# Patient Record
Sex: FEMALE | Race: WHITE
[De-identification: names, ages, dates, MRNs, and addresses within clinical notes are randomized per-mention and may not be internally consistent; named-entity substitution may affect disease eponyms.]

---

## 2022-08-03 ENCOUNTER — HOSPITAL ENCOUNTER (EMERGENCY)
Dept: HOSPITAL 97 - ER | Age: 22
LOS: 1 days | Discharge: HOME | End: 2022-08-04
Payer: COMMERCIAL

## 2022-08-03 DIAGNOSIS — E86.0: Primary | ICD-10-CM

## 2022-08-03 DIAGNOSIS — Z20.822: ICD-10-CM

## 2022-08-03 DIAGNOSIS — G40.89: ICD-10-CM

## 2022-08-03 LAB
ALBUMIN SERPL BCP-MCNC: 4.6 G/DL (ref 3.4–5)
ALP SERPL-CCNC: 79 U/L (ref 45–117)
ALT SERPL W P-5'-P-CCNC: 27 U/L (ref 12–78)
AST SERPL W P-5'-P-CCNC: 19 U/L (ref 15–37)
BUN BLD-MCNC: 11 MG/DL (ref 7–18)
BUN BLD-MCNC: 8 MG/DL (ref 7–18)
GLUCOSE SERPLBLD-MCNC: 303 MG/DL (ref 74–106)
GLUCOSE SERPLBLD-MCNC: 95 MG/DL (ref 74–106)
HCT VFR BLD CALC: 38.9 % (ref 36–45)
INR BLD: 1.12
LYMPHOCYTES # SPEC AUTO: 1.9 K/UL (ref 0.7–4.9)
MCV RBC: 90.5 FL (ref 80–100)
METHAMPHET UR QL SCN: NEGATIVE
PMV BLD: 7.4 FL (ref 7.6–11.3)
POTASSIUM SERPL-SCNC: 3.5 MMOL/L (ref 3.5–5.1)
POTASSIUM SERPL-SCNC: 3.8 MMOL/L (ref 3.5–5.1)
RBC # BLD: 4.3 M/UL (ref 3.86–4.86)
SPECIMEN VOL FLD: 5.5 ML
THC SERPL-MCNC: POSITIVE NG/ML

## 2022-08-03 PROCEDURE — 74177 CT ABD & PELVIS W/CONTRAST: CPT

## 2022-08-03 PROCEDURE — 93005 ELECTROCARDIOGRAM TRACING: CPT

## 2022-08-03 PROCEDURE — 99284 EMERGENCY DEPT VISIT MOD MDM: CPT

## 2022-08-03 PROCEDURE — 80320 DRUG SCREEN QUANTALCOHOLS: CPT

## 2022-08-03 PROCEDURE — 85610 PROTHROMBIN TIME: CPT

## 2022-08-03 PROCEDURE — 36415 COLL VENOUS BLD VENIPUNCTURE: CPT

## 2022-08-03 PROCEDURE — 85730 THROMBOPLASTIN TIME PARTIAL: CPT

## 2022-08-03 PROCEDURE — 96361 HYDRATE IV INFUSION ADD-ON: CPT

## 2022-08-03 PROCEDURE — 84157 ASSAY OF PROTEIN OTHER: CPT

## 2022-08-03 PROCEDURE — 96360 HYDRATION IV INFUSION INIT: CPT

## 2022-08-03 PROCEDURE — 62270 DX LMBR SPI PNXR: CPT

## 2022-08-03 PROCEDURE — 80048 BASIC METABOLIC PNL TOTAL CA: CPT

## 2022-08-03 PROCEDURE — 80307 DRUG TEST PRSMV CHEM ANLYZR: CPT

## 2022-08-03 PROCEDURE — 80076 HEPATIC FUNCTION PANEL: CPT

## 2022-08-03 PROCEDURE — 89050 BODY FLUID CELL COUNT: CPT

## 2022-08-03 PROCEDURE — 81003 URINALYSIS AUTO W/O SCOPE: CPT

## 2022-08-03 PROCEDURE — 82945 GLUCOSE OTHER FLUID: CPT

## 2022-08-03 PROCEDURE — 70450 CT HEAD/BRAIN W/O DYE: CPT

## 2022-08-03 PROCEDURE — 85025 COMPLETE CBC W/AUTO DIFF WBC: CPT

## 2022-08-03 PROCEDURE — 87070 CULTURE OTHR SPECIMN AEROBIC: CPT

## 2022-08-03 PROCEDURE — 80329 ANALGESICS NON-OPIOID 1 OR 2: CPT

## 2022-08-03 NOTE — EDPHYS
Physician Documentation                                                                           

 [facility]                                                                 

Name: Clarissa Anand                                                                            

Age: 21 yrs                                                                                       

Sex: Female                                                                                       

: 2000                                                                                   

MRN: [id number]                                                                                   

Arrival Date: 2022                                                                          

Time: 19:10                                                                                       

Account#: [id number]                                                                            

Bed 5                                                                                             

Private MD:                                                                                       

ED Physician Krzysztof Moore                                                                         

HPI:                                                                                              

                                                                                             

20:01 This 21 yrs old Female presents to ER via EMS with complaints of possible seizure.      rn  

20:01 The patient presents after having a possible seizure episode. Character of seizure(s):  rn  

      Motor activity: generalized, Circulation: the patient did not experience evidence of        

      pulse disturbance. Seizure onset: just prior to arrival. Associated injury: The patient     

      did not suffer any apparent associated injury. Current symptoms: confusion. It is           

      unknown whether or not the patient has had similar symptoms in the past. The patient        

      has not recently seen a physician. EMS reports patient with possible seizure today, had     

      been throwing up and boyfriend gave her either a phenergan or zofran, family reported       

      seizure like activity, no hx of seizures. No trauma. NO fever. EMS did not witness          

      seizure. Pt with decreased responsiveness upon EMS arrival but becoming more alert with     

      time. .                                                                                     

                                                                                                  

Historical:                                                                                       

- Allergies:                                                                                      

19:30 No Known Allergies;                                                                     vc1 

- Home Meds:                                                                                      

19:30 None [Active];                                                                          vc1 

- PSHx:                                                                                           

19:30 None;                                                                                   vc1 

                                                                                                  

- Immunization history:: Adult Immunizations up to date, Unknown.                                 

- Social history:: Smoking status: unknown.                                                       

- Family history:: not pertinent.                                                                 

- Hospitalizations: : No recent hospitalization is reported.                                      

                                                                                                  

                                                                                                  

ROS:                                                                                              

20:01 Unable to obtain ROS due to patient being uncooperative.                                rn  

                                                                                                  

Exam:                                                                                             

20:01 Constitutional:  This is a well developed, well nourished patient who is awake, alert,  rn  

      and in no acute distress.  Not cooperating, not answering questions.  Covers head with      

      blanket. Head/Face:  Normocephalic, atraumatic. Eyes:  Pupils equal round and reactive      

      to light, extra-ocular motions intact.  Lids and lashes normal.  Conjunctiva and sclera     

      are non-icteric and not injected.  Cornea within normal limits.  Periorbital areas with     

      no swelling, redness, or edema. Cardiovascular:  Regular rate and rhythm.  No pulse         

      deficits. Respiratory:  No increased work of breathing, no retractions or nasal             

      flaring. Abdomen/GI:  Soft, non-tender Skin:  Warm, dry MS/ Extremity:  Pulses equal,       

      no cyanosis.   Neuro:  Awake and alert, GCS 15, moves all 4 extremities, withdraws and      

      localizes pain, does not cooperate with most of exam.                                       

21:10 ECG was reviewed by the Attending Physician.                                            rn  

                                                                                                  

Vital Signs:                                                                                      

19:19  / 57; Pulse 60; Resp 18; Temp 98.1(A); Pulse Ox 99% on R/A;                      oe  

22:22  / 68; Pulse 63; Resp 18; Pulse Ox 100% ;                                         vc1 

23:30  / 65; Pulse 64; Resp 17; Pulse Ox 100% ;                                         vc1 

                                                                                                  

Procedures:                                                                                       

21:28 Lumbar Puncture: Patient placed in left lateral decubitus position. Prepped with        rn  

      Betadine. Draped using sterile technique. used lidocaine, 2 cc. Collected 5 ml's of         

      clear fluid. Sample sent to lab. Puncture site dressed with band aid, Patient tolerated     

      well. Single stick, opening pressure only 11.                                               

                                                                                                  

MDM:                                                                                              

19:11 Patient medically screened.                                                             rn  

23:04 ED course: Pt feeling much better, no acute findings to explain seizure, CSF studies    rn  

      and imaging of brain and abdomen negative. .                                                

23:49 Differential diagnosis: seizure, adverse drug reaction, meningitis, new onset seizure,  rn  

      electrolyte disorder. Data reviewed: vital signs, nurses notes, lab test result(s),         

      EKG, radiologic studies, CT scan, and as a result, I will discharge patient.                

      Counseling: I had a detailed discussion with the patient and/or guardian regarding: the     

      historical points, exam findings, and any diagnostic results supporting the                 

      discharge/admit diagnosis, lab results, radiology results, the need for outpatient          

      follow up, to return to the emergency department if symptoms worsen or persist or if        

      there are any questions or concerns that arise at home. Response to treatment: the          

      patient's symptoms have resolved after treatment, the patient's condition has returned      

      to base line, the patient is now symptom free, and as a result, I will discharge            

      patient. Special discussion: I discussed with the patient/guardian in detail that at        

      this point there is no indication for admission to the hospital. It is understood,          

      however, that if the symptoms persist or worsen the patient needs to return immediately     

      for re-evaluation. Based on the history and exam findings, there is no indication for       

      further emergent testing or inpatient evaluation. I discussed with the patient/guardian     

      the need to see the primary care provider for further evaluation of the symptoms.           

                                                                                                  

                                                                                             

19:11 Order name: Acetaminophen; Complete Time: 21:07                                         rn  

                                                                                             

19:11 Order name: Basic Metabolic Panel; Complete Time: 21:07                                 rn  

                                                                                             

19:11 Order name: CBC with Diff; Complete Time: 20:06                                         rn  

                                                                                             

19:11 Order name: ETOH Level; Complete Time: 21:                                            rn  

                                                                                             

19:11 Order name: Hepatic Function; Complete Time: 21:                                      rn  

                                                                                             

19:11 Order name: PT-INR; Complete Time: 20:                                                rn  

                                                                                             

19:11 Order name: Ptt, Activated; Complete Time: 20:                                        rn  

                                                                                             

19:11 Order name: Salicylate; Complete Time: 21:                                            rn  

                                                                                             

19:11 Order name: Urine Drug Screen; Complete Time: 21:                                     rn  

                                                                                             

20:01 Order name: SARS-COV-2 RT PCR (Document "Date of Onset" if Symptomatic); Complete Time: rn  

      23:48                                                                                       

                                                                                             

21:28 Order name: Csf Culture                                                                 rn  

                                                                                             

21:28 Order name: Fluid Cell Count,Body; Complete Time: 22:17                                 rn  

                                                                                             

19:11 Order name: EKG; Complete Time: 19:12                                                   rn  

                                                                                             

19:11 Order name: EKG - Nurse/Tech; Complete Time: 20:53                                      rn  

                                                                                             

19:11 Order name: IV Saline Lock; Complete Time: 20:53                                        rn  

                                                                                             

19:11 Order name: Labs collected and sent; Complete Time: 20:53                               rn  

                                                                                             

19:11 Order name: Urine Dipstick-Ancillary (obtain specimen); Complete Time: 20:53            rn  

                                                                                             

19:11 Order name: Urine Pregnancy Test (obtain specimen); Complete Time: 20:53                rn  

                                                                                             

19:11 Order name: CT Head Brain wo Cont; Complete Time: 21:07                                 rn  

                                                                                             

19:11 Order name: Glucose Level; Complete Time: 22:39                                         rn  

                                                                                             

20:06 Order name: CT Abd/Pelvis - IV Contrast Only; Complete Time: 21:36                      rn  

                                                                                             

20:17 Order name: Lumbar Puncture Consent; Complete Time: 22:08                               rn  

                                                                                             

20:17 Order name: Lumbar Puncture Setup; Complete Time: 22:08                                 rn  

                                                                                             

21:28 Order name: Spinal Fluid Profile; Complete Time: 22:17                                  rn  

                                                                                             

22:00 Order name: Urine Dipstick-Ancillary; Complete Time: 22:17                              [facility]

                                                                                             

22:18 Order name: BMP: once bolus complete; Complete Time: 23:13                              rn  

                                                                                                  

EC:10 Rate is 62 beats/min. Rhythm is regular. QRS Axis is Normal. NH interval is normal. QRS rn  

      interval is normal. QT interval is normal. No Q waves. T waves are Normal. No ST            

      changes noted. Clinical impression: Normal ECG. Interpreted by me. Reviewed by me.          

                                                                                                  

Administered Medications:                                                                         

19:52 Drug: NS 0.9% 1000 ml Route: IV; Rate: 1000 ml; Site: left antecubital;                   

22:07 Follow up: Response: No adverse reaction; IV Status: Completed infusion; IV Intake:     aa9 

      1000ml                                                                                      

                                                                                                  

                                                                                                  

Disposition Summary:                                                                              

22 23:50                                                                                    

Discharge Ordered                                                                                 

      Location: Home                                                                          rn  

      Problem: new                                                                            rn  

      Symptoms: are resolved                                                                  rn  

      Condition: Stable                                                                       rn  

      Diagnosis                                                                                   

        - Nausea with vomiting, unspecified                                                   rn  

        - Dehydration                                                                         rn  

        - Other seizures                                                                      rn  

      Followup:                                                                               rn  

        - With: Private Physician                                                                  

        - When: As needed                                                                          

        - Reason: Recheck today's complaints, Re-evaluation by your physician                      

      Discharge Instructions:                                                                     

        - Discharge Summary Sheet                                                             rn  

        - Dehydration, Adult                                                                  rn  

        - Nausea and Vomiting, Adult                                                          rn  

        - Seizure, Adult                                                                      rn  

      Forms:                                                                                      

        - Medication Reconciliation Form                                                      rn  

        - Thank You Letter                                                                    rn  

        - Antibiotic Education                                                                rn  

        - Prescription Opioid Use                                                             rn  

Signatures:                                                                                       

Dispatcher [employer]                           EDRoma Colby RN                     RN   Krzysztof Ramos MD MD rn Calcote, Vanessa, RN                    RN   1                                                  

Luz Maria Kruger                           RN   aa9                                                  

                                                                                                  

Corrections: (The following items were deleted from the chart)                                    

21:29 21:28 Lumbar Puncture: Patient placed in left lateral decubitus position. Prepped with  rn  

      Betadine. Draped using sterile technique. Collected 5 ml's of clear fluid. Sample sent      

      to lab. Puncture site dressed with band aid, Patient tolerated well. Single stick,          

      opening pressure only 11. rn                                                                

                                                                                                  

**************************************************************************************************

## 2022-08-03 NOTE — RAD REPORT
EXAM DESCRIPTION:  CT - Head Brain Wo Cont - 8/3/2022 7:58 pm

 

CLINICAL HISTORY:  possible seizure, first one

 

COMPARISON:  No comparisons

 

TECHNIQUE:  Axial 5 mm thick images of the head were obtained without IV contrast.

 

All CT scans are performed using dose optimization technique as appropriate and may include automated
 exposure control or mA/KV adjustment according to patient size.

 

FINDINGS:  No intracranial hemorrhage, mass, edema or shift of mid-line structures. No acute infarcti
on changes seen. No abnormal extra-axial fluid collections. Ventricles are normal.

 

Mastoid air cells and visualized portions of the paranasal sinuses are clear.

 

No acute bony findings.

 

 

IMPRESSION:  Negative non-contrast CT head examination.

## 2022-08-03 NOTE — RAD REPORT
EXAM DESCRIPTION:  CT - Abdomen   Pelvis W Contrast - 8/3/2022 9:07 pm

 

CLINICAL HISTORY:  vomiting, elevated WBC

 

COMPARISON:  No comparisons

 

TECHNIQUE:  Biphasic, helical CT imaging of the abdomen and pelvis was performed following 100 ml non
-ionic IV contrast.

 

No oral contrast was given.

 

All CT scans are performed using dose optimization technique as appropriate and may include automated
 exposure control or mA/KV adjustment according to patient size.

 

FINDINGS:  No suspicious findings in the lung bases.

 

The liver, spleen, and pancreas show no suspicious findings. Gallbladder and biliary tree are also wi
thout suspicious finding.

 

Symmetric renal function is seen with no hydronephrosis or suspicious renal mass. No pyelonephritis o
r acute parenchymal process. No bladder abnormalities. No adrenal abnormalities. Uterus and ovaries s
how no suspicious findings.

 

No dilated bowel loops or bowel wall thickening. No appendicitis findings. No free air, free fluid or
 inflammatory stranding.  No hernia, mass or bulky lymphadenopathy.

 

No suspicious bony findings.

 

 

IMPRESSION:  Contrast enhanced CT abdomen and pelvis showing no significant or suspicious finding.

## 2022-08-03 NOTE — ER
Nurse's Notes                                                                                     

 [facility]                                                                 

Name: Clarissa Anand                                                                            

Age: 21 yrs                                                                                       

Sex: Female                                                                                       

: 2000                                                                                   

MRN: [id number]                                                                                   

Arrival Date: 2022                                                                          

Time: 19:10                                                                                       

Account#: [id number]                                                                            

Bed 5                                                                                             

Private MD:                                                                                       

Diagnosis: Nausea with vomiting, unspecified;Dehydration;Other seizures                           

                                                                                                  

Presentation:                                                                                     

                                                                                             

19:22 Chief complaint: EMS states: "She was feeling nauseous so her boyfriend gave her a few  vc1 

      of his phenergan. He wasn't able to say how many or the dose. A little while later she      

      had a seizure and it looks like she bit her lip. No seizure observed in route and she       

      hasn't said anything. She has started becoming very restless.". Coronavirus screen: At      

      this time, the client does not indicate any symptoms associated with coronavirus-19.        

      Ebola Screen: No symptoms or risks identified at this time. Initial Sepsis Screen: Does     

      the patient meet any 2 criteria? No. Patient's initial sepsis screen is negative. Does      

      the patient have a suspected source of infection? No. Patient's initial sepsis screen       

      is negative. Risk Assessment: Do you want to hurt yourself or someone else? Patient         

      reports no desire to harm self or others. Onset of symptoms was 2022.            

19:22 Method Of Arrival: EMS: [facility]                                                       1 

19:22 Acuity: HANSA 2                                                                           vc1 

                                                                                                  

Triage Assessment:                                                                                

19:47 General: Appears distressed, uncomfortable, Behavior is agitated, anxious, listless,    aa9 

      uncooperative.                                                                              

                                                                                                  

Historical:                                                                                       

- Allergies:                                                                                      

19:30 No Known Allergies;                                                                     vc1 

- Home Meds:                                                                                      

19:30 None [Active];                                                                          vc1 

- PSHx:                                                                                           

19:30 None;                                                                                   vc1 

                                                                                                  

- Immunization history:: Adult Immunizations up to date, Unknown.                                 

- Social history:: Smoking status: unknown.                                                       

- Family history:: not pertinent.                                                                 

- Hospitalizations: : No recent hospitalization is reported.                                      

                                                                                                  

                                                                                                  

Screenin:56 Abuse screen: Denies threats or abuse. Nutritional screening: No deficits noted.        kl  

      Tuberculosis screening: No symptoms or risk factors identified. Fall Risk IV access (20     

      points). Mental Status- Overestimates/Forgets Limitations (15 pts.).                        

                                                                                                  

Assessment:                                                                                       

19:53 General: Appears distressed, slender, Behavior is drowsy, restless. Pain: Unable to use kl  

      pain scale. pt reports pain but unable to clarify where. Neuro: Level of Consciousness      

      is confused, Oriented to person, pt unable to participate at this time. Cardiovascular:     

      Heart tones S1 S2 Rhythm is sinus rhythm. Respiratory: No deficits noted. Airway is         

      patent Trachea midline Respiratory effort is even, unlabored, Respiratory pattern is        

      regular, Breath sounds are clear bilaterally. GI: Parent/caregiver reports the patient      

      having intolerance of food, intolerance of fluids, nausea. : No deficits noted. No        

      signs and/or symptoms were reported regarding the genitourinary system. EENT: No            

      deficits noted. No signs and/or symptoms were reported regarding the EENT system. Derm:     

      No deficits noted. No signs and/or symptoms reported regarding the dermatologic system.     

      Parent/caregiver reports the patient having pt has been vomiting all day and was given      

      Zofran 8mg then became unresponsive.                                                        

20:14 Reassessment: Patient appears in no apparent distress at this time. Patient states      kl  

      feeling better. Patient states symptoms have improved. pt awake alert cooperative           

      provider at bedside speaking with family and patient.                                       

22:21 Reassessment: Patient and/or family updated on plan of care and expected duration. Pain vc1 

      level reassessed. Patient is alert, oriented x 3, equal unlabored respirations, skin        

      warm/dry/pink. Patient states symptoms have improved.                                       

                                                                                             

00:19 Reassessment: Patient and/or family updated on plan of care and expected duration. Pain vc1 

      level reassessed. Patient is alert, oriented x 3, equal unlabored respirations, skin        

      warm/dry/pink. Patient states feeling better. Patient states symptoms have improved.        

                                                                                                  

Vital Signs:                                                                                      

                                                                                             

19:19  / 57; Pulse 60; Resp 18; Temp 98.1(A); Pulse Ox 99% on R/A;                      oe  

22:22  / 68; Pulse 63; Resp 18; Pulse Ox 100% ;                                         vc1 

23:30  / 65; Pulse 64; Resp 17; Pulse Ox 100% ;                                         vc1 

                                                                                                  

ED Course:                                                                                        

19:10 Patient arrived in ED.                                                                  rn  

19:10 Krzysztof Moore MD is Attending Physician.                                                rn  

19:25 Triage completed.                                                                       vc1 

19:38 Acetaminophen Sent.                                                                     kl  

19:38 Basic Metabolic Panel Sent.                                                             kl  

19:38 CBC with Diff Sent.                                                                     kl  

19:38 ETOH Level Sent.                                                                        kl  

19:38 Hepatic Function Sent.                                                                  kl  

19:38 PT-INR Sent.                                                                            kl  

19:38 Ptt, Activated Sent.                                                                    kl  

19:47 Arm band placed on.                                                                     aa9 

19:47 Patient has correct armband on for positive identification. Bed in low position. Side   aa9 

      rails up X2.                                                                                

20:00 CT Head Brain wo Cont In Process Unspecified.                                           EDMS

21:08 CT Abd/Pelvis - IV Contrast Only In Process Unspecified.                                EDMS

21:49 No apparent distress. Resting quietly.                                                  kl  

22:09 SARS-COV-2 RT PCR (Document "Date of Onset" if Symptomatic) Sent.                       aa9 

22:24 BMP: once bolus complete Sent.                                                          la1 

23:06 BMP: once bolus complete Sent.                                                          tw5 

08                                                                                             

00:17 No provider procedures requiring assistance completed. IV discontinued, intact,         vc1 

      bleeding controlled, No redness/swelling at site. Pressure dressing applied.                

                                                                                                  

Administered Medications:                                                                         

                                                                                             

19:52 Drug: NS 0.9% 1000 ml Route: IV; Rate: 1000 ml; Site: left antecubital;                 kl  

22:07 Follow up: Response: No adverse reaction; IV Status: Completed infusion; IV Intake:     aa9 

      1000ml                                                                                      

                                                                                                  

                                                                                                  

Medication:                                                                                       

                                                                                             

00:19 VIS not applicable for this client.                                                     vc1 

                                                                                                  

Intake:                                                                                           

                                                                                             

22:07 IV: 1000ml; Total: 1000ml.                                                              aa9 

                                                                                                  

Outcome:                                                                                          

23:50 Discharge ordered by MD.                                                                rn  

                                                                                             

00:17 Discharged to home ambulatory, with significant other.                                  vc1 

      Condition: good                                                                             

      Discharge instructions given to patient, Instructed on discharge instructions, follow       

      up and referral plans. Demonstrated understanding of instructions, follow-up care.          

00:20 Patient left the ED.                                                                    vc1 

                                                                                                  

Signatures:                                                                                       

Dispatcher [employer]                           EDRoma Colby RN RN kl Nieto, Roman, MD MD rn Attema, Lee, FNP-C                      FNP-Cla1                                                  

Yousuf Schmitz Tiffany                                tw5                                                  

Radha Bennett RN RN   vc1                                                  

Luz Maria Kruger RN RN   aa9                                                  

                                                                                                  

**************************************************************************************************

## 2022-08-03 NOTE — XMS REPORT
Continuity of Care Document

                            Created on:August 3, 2022



Patient:CLARISSA ANAND

Sex:Female

:2000

External Reference #:[id number]





Demographics







                          Address                   [address] [place] [address]

 

                          Home Phone                [phone]

 

                          Work Phone                [phone]

 

                          Mobile Phone              [phone]

 

                          Email Address             [email or web address] CityIgnite Game [email or web address] Health [facility]

 

                          Preferred Language        English

 

                          Marital Status            Unknown

 

                          Pentecostal Affiliation     Unknown

 

                          Race                      Unknown

 

                          Additional Race(s)        Unavailable



                                                    Unavailable



                                                    White

 

                          Ethnic Group              Unknown









Author







                          Organization              [facility]

t

 

                          Address                   [address]



                                                    [place] [address]

 

                          Phone                     [phone]









Support







                Name            Relationship    Address         Phone

 

                Clarissa Anand Mother          [address] [phone]



                                                [place] [address] 

 

                Kwabena Anand Unavailable     [address] [phone]



                                                [place] [id number] 

 

                KWABENA ANAND Mother          [address] [place] [phone]



                                                [place] [id number] 









Care Team Providers







                    Name                Role                Phone

 

                    Pcp, Patient Does Not Have A Primary Care Physician [phone]



 

                    Rosina Carranza      Attending Clinician Unavailable

 

                    Deb Molina    Attending Clinician Unavailable

 

                    PARVEZ STARR         Attending Clinician Unavailable

 

                    Nurse, Nel Haro     Attending Clinician Unavailable

 

                    Izabel HAYES, Sheyla HART Attending Clinician [phone]

 

                    Claudia Brody RN     Attending Clinician Unavailable

 

                    Only, Ang Db Test   Attending Clinician Unavailable

 

                    Jonna Davalos MD     Attending Clinician [phone]

 

                    JONNA DAVALOS        Attending Clinician Unavailable

 

                    Lab, Adc Fam Pob I  Attending Clinician Unavailable

 

                    Aziza Bettencourt  Attending Clinician [phone]

 

                    2, Adc Lab          Attending Clinician Unavailable

 

                    Parvez Starr MD      Attending Clinician [phone]

 

                    Doctor Unassigned, No Name Attending Clinician Unavailable

 

                    Physician, No Primary Care Admitting Clinician Unavailable









Payers







           Payer Name Policy Type Policy Number Effective Date Expiration Date S

[facility]            [id number]  2020            



           PPO                              00:00:00              







Problems

This patient has no known problems.



Allergies, Adverse Reactions, Alerts







       Allergy Allergy Status Severity Reaction(s) Onset  Inactive Treating Comm

ents 

Source



       Name   Type                        Date   Date   Clinician        

 

       PENICILL DRUG   Active        Hives                        Univers



       IN     INGREDI                      1-25                        ity of



                                          00:00:                      [place]



                                          00                          Medical



                                                                      Branch

 

       [place] Propensi Active        Hives                        Univer

s



       in     ty to                       -25                        ity of



              adverse                      00:00:                      [place]



              reaction                      00                          Medical



              s                                                       Branch

 

       NO KNOWN Drug   Active                                           Univers



       ALLERGIE Class                                                   ity of



       S                                                              [facility]







Social History







           Social Habit Start Date Stop Date  Quantity   Comments   Source

 

           Exposure to                       Yes                   [facility]



           SARS-CoV-2                                             [facility]



           (event)                                                Branch

 

           Alcohol intake 2021-07-15 2021-07-15 Ex-drinker            University

 of



                      00:00:00   00:00:00   (finding)             [facility]

 

           Tobacco use and 2021 Never used            Universit

y of



           exposure   00:00:00   00:00:00                         [facility]

 

           Sex Assigned At 2000                       Universit

y of



           Birth      00:00:00   00:00:00                         [facility]









                Smoking Status  Start Date      Stop Date       Source

 

                Unknown if ever smoked                                 Universit

y of [place] [facility]



                                                                [place]

 

                Smoker, current status 2021 00:00:00                 Unive

rsity of [facility]



                unknown                                         Branch







Medications







       Ordered Filled Start  Stop   Current Ordering Indication Dosage Frequency

 Signature

                    Comments            Components          Source



     Medication Medication Date Date Medication? Clinician                (SIG) 

          



     Name Name                                                   

 

     medroxyPROG            Yes       [id number] 150mg                     

Univers



     ESTERone      6-07                                              ity of



     (DEPO-PROVE      15:15:                                              [place]



     RA)       00                                                Medical



     injection                                                        Branch



     150 mg                                                        

 

     medroxyPROG      -0      Yes       [id number] 150mg                     

Univers



     ESTERone      6-07                                              ity of



     (DEPO-PROVE      15:15:                                              [place]



     RA)       00                                                Medical



     injection                                                        Branch



     150 mg                                                        

 

     medroxyPROG      -0      Yes       [id number] 150mg                     

Univers



     ESTERone      6-07                                              ity of



     (DEPO-PROVE      15:15:                                              [place]



     RA)       00                                                Medical



     injection                                                        Branch



     150 mg                                                        

 

     medroxyPROG      -0      Yes       [id number] 150mg                     

Univers



     ESTERone      6-07                                              ity of



     (DEPO-PROVE      15:15:                                              [place]



     RA)       00                                                Medical



     injection                                                        Branch



     150 mg                                                        

 

     medroxyPROG      -0      Yes       [id number] 150mg                     

Univers



     ESTERone      6-07                                              ity of



     (DEPO-PROVE      15:15:                                              [place]



     RA)       00                                                Medical



     injection                                                        Branch



     150 mg                                                        

 

     medroxyPROG      0 - No             150mg                     Univ

ers



     ESTERone      1-29 12-31                                         ity of



     (DEPO-PROVE      19:45: 19:44                                         [place]



     RA)       00   :00                                          Medical



     injection                                                        Branch



     150 mg                                                        

 

     medroxyPROG      2021- No             150mg                     Univ

ers



     ESTERone                                               ity of



     (DEPO-PROVE      19:45: 19:44                                         [place]



     RA)       00   :00                                          Medical



     injection                                                        Branch



     150 mg                                                        

 

     medroxyPROG      2021- No             150mg                     Univ

ers



     ESTERone                                               ity of



     (DEPO-PROVE      19:45: 19:44                                         [place]



     RA)       00   :00                                          Medical



     injection                                                        Branch



     150 mg                                                        

 

     medroxyPROG      2021- No             150mg                     Univ

ers



     ESTERone                                               ity of



     (DEPO-PROVE      19:45: 19:44                                         [place]



     RA)       00   :00                                          Medical



     injection                                                        Branch



     150 mg                                                        

 

     medroxyPROG      2021- No             150mg                     Univ

ers



     ESTERone                                               ity of



     (DEPO-PROVE      19:45: 19:44                                         [place]



     RA)       00   :00                                          Medical



     injection                                                        Branch



     150 mg                                                        

 

     medroxyPROG      2021- No             150mg      150 mg,           U

nivers



     ESTERone                                Intramuscu           ity 

of



     (DEPO-PROVE      19:45: 19:44                          lar,           [place]



     RA)       00   :00                           [id number],           Medical



     injection                                         4 doses,           Branch



     150 mg                                         First dose           



                                                  on 21 at           



                                                  1345, Last           



                                                  dose on           



                                                  Fri            



                                                  10/8/21 at           



                                                  1345,           



                                                  Routine           

 

     mv,Ca,min/i      0      Yes                      Take by           Uni

vers



     juan/FA/guar      1-25                               mouth.           ity of



     shea/caff      21:18:                                              [place]



     (ONE-A-DAY      11                                                Medical



     WOMEN'S                                                        Branch



     ACTIVE                                                        



     ORAL)                                                        

 

     mv,Ca,min/i      -0      Yes                      Take by           Uni

vers



     juan/FA/guar      1-25                               mouth.           ity of



     shea/caff      21:18:                                              [place]



     (ONE-A-DAY      11                                                Medical



     WOMEN'S                                                        Branch



     ACTIVE                                                        



     ORAL)                                                        

 

     mv,Ca,min/i      -0      Yes                      Take by           Uni

vers



     juan/FA/guar      1-25                               mouth.           ity of



     shea/caff      15:18:                                              [place]



     (ONE-A-DAY      11                                                Medical



     WOMEN'S                                                        Branch



     ACTIVE                                                        



     ORAL)                                                        

 

     mv,Ca,min/i      2021-0      Yes                      Take by           Uni

vers



     juan/FA/guar      1-25                               mouth.           ity of



     shea/caff      15:18:                                              [place]



     (ONE-A-DAY      11                                                Medical



     WOMEN'S                                                        Branch



     ACTIVE                                                        



     ORAL)                                                        

 

     mv,Ca,min/i            Yes                      Take by           Uni

vers



     juan/FA/guar      1-25                               mouth.           ity of



     shea/caff      15:18:                                              [place]



     (ONE-A-DAY      11                                                Medical



     WOMEN'S                                                        Branch



     ACTIVE                                                        



     ORAL)                                                        

 

     mv,Ca,min/i            Yes                      Take by           Uni

vers



     juan/FA/guar      1-25                               mouth.           ity of



     shea/caff      15:18:                                              [place]



     (ONE-A-DAY      11                                                Medical



     WOMEN'S                                                        Branch



     ACTIVE                                                        



     ORAL)                                                        







Procedures







                Procedure       Date / Time     Performing Clinician Source



                                Performed                       

 

                PROLACTIN       2021 19:25:00 [facility], [facility]

 

                THYROID STIMULATING 2021 19:25:00 Fish Parvez     [facility]

[facility] [place]



                HORMONE                                         [facility]

 

                BASIC METABOLIC PANEL 2021 19:25:00 Fish Parvez     Univer

[facility] [place]



                (NA, K, CL, CO2,                                 Medical Branch



                GLUCOSE, BUN,                                   



                CREATININE, CA)                                 

 

                LIPID PANEL     2021 19:25:00 [facility], [facility]



                ([id number])(TOTAL                                   Medical Branch



                CHOLESTEROL,                                    



                TRIGLYCERIDES, HDL)                                 

 

                ADC OR FELICITY ONLY - 2021 19:25:00 Fish, Parvez     Univer

[facility] [place]



                RPR                                             Medical Branch

 

                HIV 1/2 AG-AB WITH 2021 19:25:00 Fish Parvez     [facility]

[facility] [place]



                REFLEX                                          [facility]

 

                ASSIGNMENT OF BENEFITS 2021 20:55:16 Doctor Unassigned, No

 [facility]



                                                Name            Medical Branch







Encounters







        Start   End     Encounter Admission Attending Care    Care    Encounter 

Source



        Date/Time Date/Time Type    Type    Clinicians Facility Department ID   

   

 

        [facility]  [id number]

 Common



        08:07:02                         Rosina                     [facility]

 

        2022         Outpatient         Tere [facility]  [id number]

 Common



        09:09:04                         Rosina                     [facility]

 

        [id number] 

[facility]



        00:02:00                         Deb                    [facility]



                                                                        [facility]

 

        2020         [facility]    [id number] 

[facility]



        00:06:00                         Deb                    [facility]



                                                                        [facility]



                                                                        l of



                                                                        [place]

 

        2022 ambulatory                 [facility]  [facility]  [id number]

 Common



        00:00:00 00:00:00                                                 [facility]

 

        2022 ambulatory                 [facility]  [facility]  [id number]

 Common



        00:00:00 00:00:00                                                 [facility]

 

        2022 ambulatory                 [facility]  [facility]  [id number]

 Common



        00:00:00 00:00:00                                                 [facility]

 

        2022 ambulatory                 [facility]  [facility]  [id number]

 Common



        00:00:00 00:00:00                                                 [facility]

 

        2022 Outpatient DARNELL DE LA PAZN [facility]    [id number]

[id number] Univers



        14:00:00 14:00:00                                         [id number]  [facility]

 

        2022 Outpatient DARNELL DE LA PAZN [facility]    [id number]

[id number] Univers



        14:00:00 14:00:00                                                 [facility]

 

        2022 Outpatient                 [facility]    [id number]

-20 Univers



        13:00:00 13:00:00                                         [id number]  [facility]

 

        2021-10-13 2021-10-13 Nurse           Nurse, Julioj Tahir [facility] 1.2.840.

[id number] 

Univers



        10:00:39 10:24:33 Visit           Sheyla Paiz [id number]

         ity of



                                                Pediatric [id number]         [facility]  [phone]         [address]

 

        2021-10-13 2021-10-13 Outpatient R               [facility]    [id number]

[id number] Univers



        10:20:00 10:20:00                                                 [facility]

 

        2021-10-13 2021-10-13 Outpatient R               [facility]    [id number]

-20 Univers



        10:00:00 10:00:00                                         [id number]  ity [facility]

 

        2021-10-13 2021-10-13 Outpatient R               [facility]    [id number]

[id number] Univers



        10:00:00 10:00:00                                                 ity [facility]

 

        2021 Telephone         OH Brody    [phone] [id number]

[id number] Univers



        00:00:00 00:00:00                 Claudia NAVA   [id number]         it

y of



                                                [facility] [id number]         Marco A

as



                                                        [phone]         [address]

 

        2021 Laboratory         Only, Ang Db Test [facility]    1.2.8

[id number] 

Univers



        09:03:10 09:18:10 Only            Anoop [facility]  [id number]      

   ity of



                                                [place] [id number]         [place] A

as



                                                [place] [phone]         [address]



                                                Medical                 



                                                Office                  



                                                Building                 

 

        2021 Outpatient                 [facility]    [id number]

-20 Univers



        09:15:00 09:15:00                                         [id number]  ity [facility]

 

        2021 Outpatient R       ANOOP   [facility]    [id number]

[id number] Univers



        09:15:00 09:15:00                 JONNA                          ity [facility]

 

        2021-07-15 2021-07-15 Outpatient R               [facility]    [id number]

-20 Univers



        10:00:00 10:00:00                                         [id number]  ity [facility]

 

        2021-07-15 2021-07-15 Outpatient R               [facility]    [id number]

[id number] Univers



        10:00:00 10:00:00                                                 ity of



                                                                        [facility]

 

        2021 Outpatient R               [facility]    [id number]

-20 Univers



        10:00:00 10:00:00                                         [id number]  ity [facility]

 

        2021 Outpatient R       PARVEZ STARR [facility]    [id number]

[id number] Univers



        10:00:00 10:00:00                                                 ity [facility]

 

        2021 Laboratory         Lab, Adc Fam Pob I [facility]    1.2.

[id number] 

Univers



        14:55:01 15:00:42 Only            Aziza Terry [facility]  [id number]    

     ity [facility] [id number]         Mar[place] A

as



                                                [employer] [id number]         Me

dical



                                                nal     [address]             [address]



                                                Office                  



                                                Building                 



                                                One                     

 

        2021 Outpatient R               [facility]    [id number]

-20 Univers



        15:00:00 15:00:00                                         [id number]  ity of



                                                                        [facility]

 

        2021 Outpatient R               [facility]    [id number]

[id number] Univers



        15:00:00 15:00:00                                                 ity of



                                                                        [facility]

 

        2021 Outpatient R               [facility]    [id number]

-20 Univers



        10:00:00 10:00:00                                         [id number]  ity of



                                                                        [facility]

 

        2021 Outpatient R               [facility]    [id number]

[id number] Univers



        10:00:00 10:00:00                                                 ity of



                                                                        [facility]

 

        2021 Outpatient                 [facility]    [id number]

-20 Univers



        11:15:00 11:15:00                                         [id number]  ity of



                                                                        [facility]

 

        2021 Outpatient R               [facility]    [id number]

[id number] Univers



        11:15:00 11:15:00                                                 ity [facility]

 

        2021 Technician         2, [facility]    [id number] 

[id number] Univers



        13:18:52 13:33:52 Visit           Parvez Starr [id number]      

   ity [facility] [id number]         [place]

s



                                                [employer] [phone]         Me

dical



                                                nal     353             [facility]                 

 

        2021 Outpatient R               [facility]    [id number]

-20 Univers



        13:15:00 13:15:00                                         [id number]  ity [facility]

 

        2021 Outpatient R               [facility]    [id number]

[id number] Univers



        13:15:00 13:15:00                                                 ity of



                                                                        [facility]

 

        2021 Outpatient PARVEZ DE LA PAZ [facility]    [id number]

[id number] Univers



        15:00:00 15:00:00                                                 ity of



                                                                        [facility]

 

        2021 Orders          Doctor  [place]    [id number]

[id number] Univers



        00:00:00 00:00:00 Only            Unassigned, BRANDY   [id number]       

  ity of



                                        [facility] [id number]         [place] A

as



                                                        [phone]         [address]

 

        [id number]

-20 [facility]



        14:04:00 14:04:00                 Deb                    Woman'

s



                                                                        [facility]

 

        [facility]    [id number]

-20 [facility]



        14:37:00 14:37:00                 Deb                    [facility]







Results







           Test Description Test Time  Test Comments Results    Result Comments 

Source









                    ADC OR FELICITY ONLY - RPR 2021 07:59:00 









                      Test Item  Value      Reference Range Interpretation Comme

nts









             RPR (Qualitative) (test code = 07280-3) Nonreactive  Nonreactive   

            

 

             Lab Interpretation (test code = 47122-6) Normal                    

             



[facility]





             Test Item    Value        Reference Range Interpretation Comments

 

             PROLACTIN (test code = 0551972926) 8.8 ng/mL    3.3-26.7           

       

 

             Lab Interpretation (test code = Normal                             

    



             22906-5)                                            



[facility] WITH [id number] 22:10:00





             Test Item    Value        Reference Range Interpretation Comments

 

             HIV          Negative     Negative                  



             Semi-quantitative                                        



             (test code =                                        



             41505-6)                                            

 

             CAROLYN (test code = Non-reactive for HIV-1                           



             CAROLYN)         antigen and HIV-1/HIV-2                           



                          antibodies. ?No                           



                          laboratory evidence of                           



                          HIV infection. ?Repeat in                           



                          2-4 weeks if acute HIV                           



                          infection is suspected.                           



[facility] [id number] 22:01:00





             Test Item    Value        Reference Range Interpretation Comments

 

             TSH (test code =              See_Comment                [Automated

 message]



             [id number])                                         The system [employer]



                                                                 generated this 

result



                                                                 transmitted ref

erence



                                                                 range: 0.45 - 4

.70



                                                                 mIU/L. The refe

rence



                                                                 range was not u

sed to



                                                                 interpret this 

result



                                                                 as normal/abnor

mal.

 

             Lab Interpretation (test Normal                                 



             code = 25587-6)                                        



[facility] (19265)(TOTAL CHOLESTEROL, 
TRIGLYCERIDES, HDL)2021 21:32:00





             Test Item    Value        Reference Range Interpretation Comments

 

             CHOL (test code = 180 mg/dL    120-200                   



             [id number])                                         

 

             HDL (test code = 64 mg/dL     >50                       



             2441097409)                                         

 

             HDLC RATIO (test code =              See_Comment                [Au

tomated message]



             5197513959)                                         The system [employer]



                                                                 generated this



                                                                 result transmit

ayleen



                                                                 reference range

:



                                                                 <=4.5. The refe

rence



                                                                 range was not u

sed



                                                                 to interpret th

is



                                                                 result as



                                                                 normal/abnormal

.

 

             TRIG (test code = 50 mg/dL                         



             9044241450)                                         

 

             LDL CHOL (test code = 106 mg/dL    See_Comment                [Auto

mated message]



             98625-5)                                            The system [employer]



                                                                 generated this



                                                                 result transmit

ayleen



                                                                 reference range

:



                                                                 <=160. The refe

rence



                                                                 range was not u

sed



                                                                 to interpret th

is



                                                                 result as



                                                                 normal/abnormal

.

 

             VLDL (test code = 10 mg/dL     5-60                      



             2252586289)                                         

 

             Lab Interpretation (test Normal                                 



             code = 64556-2)                                        



[facility] (NA, K, CL, CO2, 
GLUCOSE, BUN, CREATININE, CA)2021 21:31:00





             Test Item    Value        Reference Range Interpretation Comments

 

             NA (test code = 139 mmol/L   135-145                   



             2359885728)                                         

 

             K (test code = 4.3 mmol/L   3.5-5                     



             4400761587)                                         

 

             CL (test code = 101 mmol/L                       



             2429369162)                                         

 

             CO2 TOTAL (test code = 29 mmol/L    23-31                     



             5814844573)                                         

 

             AGAP (test code =              2-16                      



             5899334424)                                         

 

             BUN (test code = 9 mg/dL      7-23                      



             7979584292)                                         

 

             GLUCOSE (test code = 90 mg/dL                         



             3510469475)                                         

 

             CREATININE (test code 0.68 mg/dL   0.5-1.04                  



             = 7275264498)                                        

 

             CALCIUM (test code = 9.7 mg/dL    8.6-10.6                  



             9877132810)                                         

 

             eGFR Calculation              mL/min/1.73m2              



             (Non-)                                        



             (test code =                                        



             5394447416)                                         

 

             eGFR Calculation              mL/min/1.73m2              



             (African American)                                        



             (test code =                                        



             3968175180)                                         

 

             CAROLYN (test code = CAROLYN) Association of                           



                          Glomerular Filtration                           



                          Rate (GFR) and Staging                           



                          of Kidney Disease*                           



                          +----------------------                           



                          -+---------------------                           



                          +----------------------                           



                          ---+| GFR (mL/min/1.73                           



                          m2) ?| With Kidney                           



                          Damage ?| ?Without                           



                          Kidney                                 



                          Damage+----------------                           



                          -------+---------------                           



                          ------+----------------                           



                          ---------+| ?>90 ? ? ?                           



                          ? ? ? ? ? ?| ?Stage one                           



                          ? ? ? ? ?| ? Normal ? ?                           



                          ? ? ? ? ?                              



                          ?+---------------------                           



                          --+--------------------                           



                          -+---------------------                           



                          ----+| ?60-89 ? ? ? ? ?                           



                          ? ? ?| ?Stage two ? ? ?                           



                          ? ?| ? Decreased GFR ?                           



                          ? ? ?                                  



                          +----------------------                           



                          -+---------------------                           



                          +----------------------                           



                          ---+| ?30-59 ? ? ? ? ?                           



                          ? ? ?| ?Stage three ? ?                           



                          ? ?| ? Stage three ? ?                           



                          ? ? ?                                  



                          +----------------------                           



                          -+---------------------                           



                          +----------------------                           



                          ---+| ?15-29 ? ? ? ? ?                           



                          ? ? ?| ?Stage four ? ?                           



                          ? ? | ? Stage four ? ?                           



                          ? ? ?                                  



                          ?+---------------------                           



                          --+--------------------                           



                          -+---------------------                           



                          ----+| ?<15 (or                           



                          dialysis) ? ?| ?Stage                           



                          five ? ? ? ? | ? Stage                           



                          five ? ? ? ? ?                           



                          ?+---------------------                           



                          --+--------------------                           



                          -+---------------------                           



                          ----+ *Each stage                           



                          assumes the associated                           



                          GFR level has been in                           



                          effect for at least                           



                          three months. ?Stages 1                           



                          to 5, with or without                           



                          kidney disease,                           



                          indicate chronic kidney                           



                          disease. Notes:                           



                          Determination of stages                           



                          one and two (with eGFR                           



                          >59mL/min/1.73 m2)                           



                          requires estimation of                           



                          kidney damage for at                           



                          least three months as                           



                          defined by structural                           



                          or functional                           



                          abnormalities of the                           



                          kidney, manifested by                           



                          either:Pathological                           



                          abnormalities or                           



                          Markers of kidney                           



                          damage (including                           



                          abnormalities in the                           



                          composition of the                           



                          blood or urine or                           



                          abnormalities in                           



                          imaging tests).                           



[facility]

## 2022-08-04 ENCOUNTER — HOSPITAL ENCOUNTER (OUTPATIENT)
Dept: HOSPITAL 97 - ER | Age: 22
Setting detail: OBSERVATION
Discharge: HOME | End: 2022-08-04
Attending: STUDENT IN AN ORGANIZED HEALTH CARE EDUCATION/TRAINING PROGRAM | Admitting: STUDENT IN AN ORGANIZED HEALTH CARE EDUCATION/TRAINING PROGRAM
Payer: COMMERCIAL

## 2022-08-04 VITALS — OXYGEN SATURATION: 100 %

## 2022-08-04 VITALS — SYSTOLIC BLOOD PRESSURE: 122 MMHG | DIASTOLIC BLOOD PRESSURE: 65 MMHG

## 2022-08-04 VITALS — OXYGEN SATURATION: 100 % | SYSTOLIC BLOOD PRESSURE: 130 MMHG | TEMPERATURE: 98.2 F | DIASTOLIC BLOOD PRESSURE: 100 MMHG

## 2022-08-04 VITALS — TEMPERATURE: 98.1 F

## 2022-08-04 VITALS — BODY MASS INDEX: 20 KG/M2

## 2022-08-04 DIAGNOSIS — F12.90: ICD-10-CM

## 2022-08-04 DIAGNOSIS — R56.9: Primary | ICD-10-CM

## 2022-08-04 DIAGNOSIS — F32.A: ICD-10-CM

## 2022-08-04 DIAGNOSIS — R73.9: ICD-10-CM

## 2022-08-04 DIAGNOSIS — K21.9: ICD-10-CM

## 2022-08-04 DIAGNOSIS — Z83.3: ICD-10-CM

## 2022-08-04 DIAGNOSIS — Z88.0: ICD-10-CM

## 2022-08-04 LAB
BUN BLD-MCNC: 8 MG/DL (ref 7–18)
GLUCOSE SERPLBLD-MCNC: 164 MG/DL (ref 74–106)
HCT VFR BLD CALC: 36.4 % (ref 36–45)
LYMPHOCYTES # SPEC AUTO: 1.5 K/UL (ref 0.7–4.9)
MAGNESIUM SERPL-MCNC: 1.8 MG/DL (ref 1.8–2.4)
MCV RBC: 89 FL (ref 80–100)
PMV BLD: 8 FL (ref 7.6–11.3)
POTASSIUM SERPL-SCNC: 3.7 MMOL/L (ref 3.5–5.1)
RBC # BLD: 4.09 M/UL (ref 3.86–4.86)

## 2022-08-04 PROCEDURE — 71045 X-RAY EXAM CHEST 1 VIEW: CPT

## 2022-08-04 PROCEDURE — 96374 THER/PROPH/DIAG INJ IV PUSH: CPT

## 2022-08-04 PROCEDURE — 99283 EMERGENCY DEPT VISIT LOW MDM: CPT

## 2022-08-04 PROCEDURE — 36415 COLL VENOUS BLD VENIPUNCTURE: CPT

## 2022-08-04 PROCEDURE — 85025 COMPLETE CBC W/AUTO DIFF WBC: CPT

## 2022-08-04 PROCEDURE — 82947 ASSAY GLUCOSE BLOOD QUANT: CPT

## 2022-08-04 PROCEDURE — 009U3ZX DRAINAGE OF SPINAL CANAL, PERCUTANEOUS APPROACH, DIAGNOSTIC: ICD-10-PCS

## 2022-08-04 PROCEDURE — 84100 ASSAY OF PHOSPHORUS: CPT

## 2022-08-04 PROCEDURE — 70553 MRI BRAIN STEM W/O & W/DYE: CPT

## 2022-08-04 PROCEDURE — 80048 BASIC METABOLIC PNL TOTAL CA: CPT

## 2022-08-04 PROCEDURE — 83735 ASSAY OF MAGNESIUM: CPT

## 2022-08-04 PROCEDURE — 95819 EEG AWAKE AND ASLEEP: CPT

## 2022-08-04 RX ADMIN — LEVETIRACETAM SCH: 100 INJECTION, SOLUTION, CONCENTRATE INTRAVENOUS at 09:00

## 2022-08-04 RX ADMIN — HUMAN INSULIN SCH: 100 INJECTION, SOLUTION SUBCUTANEOUS at 11:30

## 2022-08-04 RX ADMIN — HUMAN INSULIN SCH: 100 INJECTION, SOLUTION SUBCUTANEOUS at 07:30

## 2022-08-04 RX ADMIN — LEVETIRACETAM SCH: 100 INJECTION, SOLUTION, CONCENTRATE INTRAVENOUS at 07:30

## 2022-08-04 NOTE — XMS REPORT
Continuity of Care Document

                            Created on:2022



Patient:CLARISSA ANAND

Sex:Female

:2000

External Reference #:[id number]





Demographics







                          Address                   [address] [place] [address]

 

                          Home Phone                [phone]

 

                          Work Phone                [phone]

 

                          Mobile Phone              [phone]

 

                          Email Address             [email or web address]

 

                          Preferred Language        English

 

                          Marital Status            Unknown

 

                          Bahai Affiliation     Unknown

 

                          Race                      Unknown

 

                          Additional Race(s)        Unavailable



                                                    Unavailable



                                                    White

 

                          Ethnic Group              Unknown









Author







                          Organization              [facility]

t

 

                          Address                   [address]



                                                    [place] [address]

 

                          Phone                     [phone]









Support







                Name            Relationship    Address         Phone

 

                Clarissa Anand Mother          [address] [phone]



                                                [place] [address] 

 

                Kwabena Anand Unavailable     [address] [phone]



                                                [place] [id number] 

 

                KWABENA ANAND Mother          [address] [place] [phone]



                                                [place] [id number] 









Care Team Providers







                    Name                Role                Phone

 

                    Pcp, Patient Does Not Have A Primary Care Physician +1-000-0



 

                    Rosina Carranza      Attending Clinician Unavailable

 

                    Deb Molina    Attending Clinician Unavailable

 

                    PARVEZ STARR         Attending Clinician Unavailable

 

                    Nurse, Nel Haro     Attending Clinician Unavailable

 

                    Izabel HAYES, Sheyla HART Attending Clinician [phone]

 

                    Claudia Brody RN     Attending Clinician Unavailable

 

                    Only, Ang Db Test   Attending Clinician Unavailable

 

                    Jonna Davalos MD     Attending Clinician [phone]

 

                    JONNA DAVALOS        Attending Clinician Unavailable

 

                    Lab, Adc Fam Pob I  Attending Clinician Unavailable

 

                    Aziza Bettencourt  Attending Clinician [phone]

 

                    2, Adc Lab          Attending Clinician Unavailable

 

                    Parvez Starr MD      Attending Clinician [phone]

 

                    Doctor Unassigned, No Name Attending Clinician Unavailable

 

                    Physician, No Primary Care Admitting Clinician Unavailable









Payers







           Payer Name Policy Type Policy Number Effective Date Expiration Date S

[facility]            [id number]  2020            



           PPO                              00:00:00              







Problems

This patient has no known problems.



Allergies, Adverse Reactions, Alerts







       Allergy Allergy Status Severity Reaction(s) Onset  Inactive Treating Comm

ents 

Source



       Name   Type                        Date   Date   Clinician        

 

       [facility] DRUG   Active        Hives                        Univers



       IN     INGREDI                      1-25                        ity of



                                          00:00:                      [place]



                                          00                          Medical



                                                                      Branch

 

       [facility] Propensi Active        Hives                        Univer

s



       in     ty to                       -25                        ity of



              adverse                      00:00:                      [place]



              reaction                      00                          Medical



              s                                                       Branch

 

       NO KNOWN Drug   Active                                           Univers



       ALLERGIE Class                                                   ity of



       S                                                              [facility] [place]







Social History







           Social Habit Start Date Stop Date  Quantity   Comments   Source

 

           Exposure to                       Yes                   [facility]



           SARS-CoV-2                                             [facility]



           (event)                                                Branch

 

           Alcohol intake 2021-07-15 2021-07-15 Ex-drinker            University

 of



                      00:00:00   00:00:00   (finding)             [facility] [place]

 

           Tobacco use and 2021 Never used            Universit

y of



           exposure   00:00:00   00:00:00                         [facility] [place]

 

           Sex Assigned At 2000                       Universit

y of



           Birth      00:00:00   00:00:00                         [facility] [place]









                Smoking Status  Start Date      Stop Date       Source

 

                Unknown if ever smoked                                 Universit

y of [place] [facility]



                                                                [place]

 

                Smoker, current status 2021 00:00:00                 Unive

rsity of [facility]



                unknown                                         Branch







Medications







       Ordered Filled Start  Stop   Current Ordering Indication Dosage Frequency

 Signature

                    Comments            Components          Source



     Medication Medication Date Date Medication? Clinician                (SIG) 

          



     Name Name                                                   

 

     medroxyPROG            Yes       [id number] 150mg                     

Univers



     ESTERone      6-07                                              ity of



     (DEPO-PROVE      15:15:                                              [place]



     RA)       00                                                Medical



     injection                                                        Branch



     150 mg                                                        

 

     medroxyPROG      -0      Yes       [id number] 150mg                     

Univers



     ESTERone      6-07                                              ity of



     (DEPO-PROVE      15:15:                                              [place]



     RA)       00                                                Medical



     injection                                                        Branch



     150 mg                                                        

 

     medroxyPROG      -0      Yes       [id number] 150mg                     

Univers



     ESTERone      6-07                                              ity of



     (DEPO-PROVE      15:15:                                              [place]



     RA)       00                                                Medical



     injection                                                        Branch



     150 mg                                                        

 

     medroxyPROG      -0      Yes       [id number] 150mg                     

Univers



     ESTERone      6-07                                              ity of



     (DEPO-PROVE      15:15:                                              [place]



     RA)       00                                                Medical



     injection                                                        Branch



     150 mg                                                        

 

     medroxyPROG      -0      Yes       [id number] 150mg                     

Univers



     ESTERone      6-07                                              ity of



     (DEPO-PROVE      15:15:                                              [place]



     RA)       00                                                Medical



     injection                                                        Branch



     150 mg                                                        

 

     medroxyPROG      0 - No             150mg                     Univ

ers



     ESTERone      1-29 12-31                                         ity of



     (DEPO-PROVE      19:45: 19:44                                         [place]



     RA)       00   :00                                          Medical



     injection                                                        Branch



     150 mg                                                        

 

     medroxyPROG      2021- No             150mg                     Univ

ers



     ESTERone                                               ity of



     (DEPO-PROVE      19:45: 19:44                                         [place]



     RA)       00   :00                                          Medical



     injection                                                        Branch



     150 mg                                                        

 

     medroxyPROG      2021- No             150mg                     Univ

ers



     ESTERone                                               ity of



     (DEPO-PROVE      19:45: 19:44                                         [place]



     RA)       00   :00                                          Medical



     injection                                                        Branch



     150 mg                                                        

 

     medroxyPROG      2021- No             150mg                     Univ

ers



     ESTERone                                               ity of



     (DEPO-PROVE      19:45: 19:44                                         [place]



     RA)       00   :00                                          Medical



     injection                                                        Branch



     150 mg                                                        

 

     medroxyPROG      2021- No             150mg                     Univ

ers



     ESTERone                                               ity of



     (DEPO-PROVE      19:45: 19:44                                         [place]



     RA)       00   :00                                          Medical



     injection                                                        Branch



     150 mg                                                        

 

     medroxyPROG      2021- No             150mg      150 mg,           U

nivers



     ESTERone                                Intramuscu           ity 

of



     (DEPO-PROVE      19:45: 19:44                          lar,           [place]



     RA)       00   :00                           [id number],           Medical



     injection                                         4 doses,           Branch



     150 mg                                         First dose           



                                                  on 21 at           



                                                  1345, Last           



                                                  dose on           



                                                  Fri            



                                                  10/8/21 at           



                                                  1345,           



                                                  Routine           

 

     mv,Ca,min/i      0      Yes                      Take by           Uni

vers



     juan/FA/guar      1-25                               mouth.           ity of



     shea/caff      21:18:                                              [place]



     (ONE-A-DAY      11                                                Medical



     WOMEN'S                                                        Branch



     ACTIVE                                                        



     ORAL)                                                        

 

     mv,Ca,min/i      -0      Yes                      Take by           Uni

vers



     juan/FA/guar      1-25                               mouth.           ity of



     shea/caff      21:18:                                              [place]



     (ONE-A-DAY      11                                                Medical



     WOMEN'S                                                        Branch



     ACTIVE                                                        



     ORAL)                                                        

 

     mv,Ca,min/i      -0      Yes                      Take by           Uni

vers



     juan/FA/guar      1-25                               mouth.           ity of



     shea/caff      15:18:                                              [place]



     (ONE-A-DAY      11                                                Medical



     WOMEN'S                                                        Branch



     ACTIVE                                                        



     ORAL)                                                        

 

     mv,Ca,min/i      2021-0      Yes                      Take by           Uni

vers



     juan/FA/guar      1-25                               mouth.           ity of



     shea/caff      15:18:                                              [place]



     (ONE-A-DAY      11                                                Medical



     WOMEN'S                                                        Branch



     ACTIVE                                                        



     ORAL)                                                        

 

     mv,Ca,min/i            Yes                      Take by           Uni

vers



     juan/FA/guar      1-25                               mouth.           ity of



     shea/caff      15:18:                                              [place]



     (ONE-A-DAY      11                                                Medical



     WOMEN'S                                                        Branch



     ACTIVE                                                        



     ORAL)                                                        

 

     mv,Ca,min/i            Yes                      Take by           Uni

vers



     juan/FA/guar      1-25                               mouth.           ity of



     shea/caff      15:18:                                              [place]



     (ONE-A-DAY      11                                                Medical



     WOMEN'S                                                        Branch



     ACTIVE                                                        



     ORAL)                                                        







Procedures







                Procedure       Date / Time     Performing Clinician Source



                                Performed                       

 

                PROLACTIN       2021 19:25:00 [facility]

 

                THYROID STIMULATING 2021 19:25:00 Fish Parvez     [facility]

[facility] [place]



                HORMONE                                         [facility]

 

                BASIC METABOLIC PANEL 2021 19:25:00 Fish Parvez     Univer

[facility] [place]



                (NA, K, CL, CO2,                                 Medical Branch



                GLUCOSE, BUN,                                   



                CREATININE, CA)                                 

 

                LIPID PANEL     2021 19:25:00 [facility]



                ([id number])(TOTAL                                   Medical Branch



                CHOLESTEROL,                                    



                TRIGLYCERIDES, HDL)                                 

 

                ADC OR FELICITY ONLY - 2021 19:25:00 Fish, Parvez     Univer

[facility] [place]



                RPR                                             Medical Branch

 

                HIV 1/2 AG-AB WITH 2021 19:25:00 Fish Parvez     [facility]

[facility] [place]



                REFLEX                                          [facility]

 

                ASSIGNMENT OF BENEFITS 2021 20:55:16 Doctor Unassigned, No

 [facility]



                                                Name            Medical Branch







Encounters







        Start   End     Encounter Admission Attending Care    Care    Encounter 

Source



        Date/Time Date/Time Type    Type    Clinicians Facility Department ID   

   

 

        [facility]  [id number]

 Common



        08:07:02                         Rosina                     [facility]

 

        2022         Outpatient         [facility]  [id number]

 Common



        09:09:04                         Rosina                     [facility]

 

        2020         [facility]    [id number] 

[facility]



        00:02:00                         Deb                    [facility]



                                                                        [facility]

 

        2020         [facility]    [id number] 

[facility]



        00:06:00                         Deb                    [facility]



                                                                        [facility]osp[facility]



                                                                        l of



                                                                        [place]

 

        2022 ambulatory                 [facility]  [facility]  [id number]

 Common



        00:00:00 00:00:00                                                 [facility]

 

        2022 ambulatory                 [facility]  [facility]  [id number]

 Common



        00:00:00 00:00:00                                                 [facility]

 

        2022 ambulatory                 [facility]  [facility]  [id number]

 Common



        00:00:00 00:00:00                                                 [facility]

 

        2022 ambulatory                 [facility]  [facility]  [id number]

 Common



        00:00:00 00:00:00                                                 [facility]

 

        2022 Outpatient DARNELL DE LA PAZN [facility]    [id number]

[id number] Univers



        14:00:00 14:00:00                                         [id number]  [facility]

 

        2022 Outpatient DARNELL DE LA PAZN [facility]    [id number]

[id number] Univers



        14:00:00 14:00:00                                                 [facility]

 

        2022 Outpatient                 [facility]    [id number]

-20 Univers



        13:00:00 13:00:00                                         [id number]  [facility]

 

        2021-10-13 2021-10-13 Nurse           Nurse, Julioj Tahir [facility] [id number].

[id number] 

Univers



        10:00:39 10:24:33 Visit           Sheyla Paiz [id number]

         ity of



                                                Pediatric [id number]         [facility]  [phone]         [address]

 

        2021-10-13 2021-10-13 Outpatient R               [facility]    [id number]

[id number] Univers



        10:20:00 10:20:00                                                 [facility]

 

        2021-10-13 2021-10-13 Outpatient R               [facility]    [id number]

-20 Univers



        10:00:00 10:00:00                                         [id number]  ity [facility]

 

        2021-10-13 2021-10-13 Outpatient R               [facility]    [id number]

[id number] Univers



        10:00:00 10:00:00                                                 ity [facility]

 

        2021 Telephone         OH Brody    [phone] [id number]

[id number] Univers



        00:00:00 00:00:00                 Claudia NAVA   [id number]         it

y of



                                                [facility] [id number]         Mar[place] A

as



                                                        [phone]         [address]

 

        2021 Laboratory         Only, Ang Db Test [facility]    1.2.8

[id number] 

Univers



        09:03:10 09:18:10 Only            Anoop [facility]  [id number]      

   ity of



                                                [place] [id number]         Marco A

as



                                                [place]lea [phone]         [address]



                                                Medical                 



                                                Office                  



                                                Building                 

 

        2021 Outpatient                 [facility]    [id number]

-20 Univers



        09:15:00 09:15:00                                         [id number]  ity [facility]

 

        2021 Outpatient R       ANOOP   [facility]    [id number]

[id number] Univers



        09:15:00 09:15:00                 JONNA                          ity [facility]

 

        2021-07-15 2021-07-15 Outpatient R               [facility]    [id number]

-20 Univers



        10:00:00 10:00:00                                         [id number]  ity [facility]

 

        2021-07-15 2021-07-15 Outpatient R               [facility]    [id number]

[id number] Univers



        10:00:00 10:00:00                                                 ity of



                                                                        [facility] [place]

 

        2021 Outpatient R               [facility]    [id number]

-20 Univers



        10:00:00 10:00:00                                         [id number]  ity [facility]

 

        2021 Outpatient R       PARVEZ STARR [facility]    [id number]

[id number] Univers



        10:00:00 10:00:00                                                 ity [facility]

 

        2021 Laboratory         Lab, Adc Fam Pob I [facility]    1.2.

[id number] 

Univers



        14:55:01 15:00:42 Only            Aziza Terry [facility]  [id number]    

     ity [facility] [id number]         Mar[place] A

as



                                                [employer] [phone]         Me

dical



                                                nal     044             Branch



                                                Office                  



                                                Building                 



                                                One                     

 

        2021 Outpatient R               [facility]    [id number]

-20 Univers



        15:00:00 15:00:00                                         [id number]  ity of



                                                                        [facility]

 

        2021 Outpatient R               [facility]    [id number]

[id number] [facility]



        15:00:00 15:00:00                                                 ity of



                                                                        [facility]

 

        2021 Outpatient R               [facility]    [id number]

-20 Univers



        10:00:00 10:00:00                                         [id number]  ity of



                                                                        [facility]

 

        2021 Outpatient R               [facility]    [id number]

[id number] [facility]



        10:00:00 10:00:00                                                 ity of



                                                                        [facility]

 

        2021 Outpatient                 [facility]    [id number]

-20 Univers



        11:15:00 11:15:00                                         [id number]  ity of



                                                                        [facility]

 

        2021 Outpatient R               [facility]    [id number]

589 Univers



        11:15:00 11:15:00                                                 ity [facility]

 

        2021 Technician         2, [facility]    [id number] 

[id number] Univers



        13:18:52 13:33:52 Visit           Parvez Starr [id number]      

   ity [facility] [id number]         [place]

[place]



                                                [employer] [phone]         Me

dical



                                                nal     353             [facility]                 

 

        2021 Outpatient R               [facility]    [id number]

-20 Univers



        13:15:00 13:15:00                                         [id number]  ity [facility]

 

        2021 Outpatient R               [facility]    [id number]

[id number] Univers



        13:15:00 13:15:00                                                 ity of



                                                                        [facility]

 

        2021 Outpatient PARVEZ DE LA PAZ [facility]    [id number]

[id number] Univers



        15:00:00 15:00:00                                                 ity of



                                                                        [facility]

 

        2021 Orders          Doctor  [place]    [id number]

[id number] Univers



        00:00:00 00:00:00 Only            Unassigned, BRANDY   [id number]       

  ity of



                                        [facility] [id number]         [place] A

as



                                                        [phone]         [address]

 

        [id number]

-20 [facility]



        14:04:00 14:04:00                 Deb                    Woman'

s



                                                                        [facility]

 

        2020 [facility]    [id number]

-20 [facility]



        14:37:00 14:37:00                 Deb                    [facility]







Results







           Test Description Test Time  Test Comments Results    Result Comments 

Source









                    ADC OR FELICITY ONLY - RPR 2021 07:59:00 









                      Test Item  Value      Reference Range Interpretation Comme

nts









             RPR (Qualitative) (test code = 67824-6) Nonreactive  Nonreactive   

            

 

             Lab Interpretation (test code = 29074-0) Normal                    

             



[facility]





             Test Item    Value        Reference Range Interpretation Comments

 

             PROLACTIN (test code = 9862352270) 8.8 ng/mL    3.3-26.7           

       

 

             Lab Interpretation (test code = Normal                             

    



             92702-5)                                            



[facility] WITH [id number] 22:10:00





             Test Item    Value        Reference Range Interpretation Comments

 

             HIV          Negative     Negative                  



             Semi-quantitative                                        



             (test code =                                        



             48469-2)                                            

 

             CAROLYN (test code = Non-reactive for HIV-1                           



             CAROLYN)         antigen and HIV-1/HIV-2                           



                          antibodies. ?No                           



                          laboratory evidence of                           



                          HIV infection. ?Repeat in                           



                          2-4 weeks if acute HIV                           



                          infection is suspected.                           



[facility] [id number] 22:01:00





             Test Item    Value        Reference Range Interpretation Comments

 

             TSH (test code =              See_Comment                [Automated

 message]



             [id number])                                         The system [employer]ent[employer]



                                                                 generated this 

result



                                                                 transmitted ref

erence



                                                                 range: 0.45 - 4

.70



                                                                 mIU/L. The refe

rence



                                                                 range was not u

sed to



                                                                 interpret this 

result



                                                                 as normal/abnor

mal.

 

             Lab Interpretation (test Normal                                 



             code = 50036-2)                                        



[facility] (68120)(TOTAL CHOLESTEROL, 
TRIGLYCERIDES, HDL)2021 21:32:00





             Test Item    Value        Reference Range Interpretation Comments

 

             CHOL (test code = 180 mg/dL    120-200                   



             [id number])                                         

 

             HDL (test code = 64 mg/dL     >50                       



             9273221151)                                         

 

             HDLC RATIO (test code =              See_Comment                [Au

tomated message]



             6231795917)                                         The system App[employer]ent[employer]



                                                                 generated this



                                                                 result transmit

ayleen



                                                                 reference range

:



                                                                 <=4.5. The refe

rence



                                                                 range was not u

sed



                                                                 to interpret th

is



                                                                 result as



                                                                 normal/abnormal

.

 

             TRIG (test code = 50 mg/dL                         



             9862486507)                                         

 

             LDL CHOL (test code = 106 mg/dL    See_Comment                [Auto

mated message]



             66099-1)                                            The system [employer]



                                                                 generated this



                                                                 result transmit

ayleen



                                                                 reference range

:



                                                                 <=160. The refe

rence



                                                                 range was not u

sed



                                                                 to interpret th

is



                                                                 result as



                                                                 normal/abnormal

.

 

             VLDL (test code = 10 mg/dL     5-60                      



             1023371016)                                         

 

             Lab Interpretation (test Normal                                 



             code = 95165-0)                                        



[facility] (NA, K, CL, CO2, 
GLUCOSE, BUN, CREATININE, CA)2021 21:31:00





             Test Item    Value        Reference Range Interpretation Comments

 

             NA (test code = 139 mmol/L   135-145                   



             [id number])                                         

 

             K (test code = 4.3 mmol/L   3.5-5                     



             1251329381)                                         

 

             CL (test code = 101 mmol/L                       



             6221410020)                                         

 

             CO2 TOTAL (test code = 29 mmol/L    23-31                     



             9692335397)                                         

 

             AGAP (test code =              2-16                      



             6921049130)                                         

 

             BUN (test code = 9 mg/dL      7-23                      



             5576511785)                                         

 

             GLUCOSE (test code = 90 mg/dL                         



             9599212930)                                         

 

             CREATININE (test code 0.68 mg/dL   0.5-1.04                  



             = 1649668479)                                        

 

             CALCIUM (test code = 9.7 mg/dL    8.6-10.6                  



             8739994845)                                         

 

             eGFR Calculation              mL/min/1.73m2              



             (Non-)                                        



             (test code =                                        



             4036836114)                                         

 

             eGFR Calculation              mL/min/1.73m2              



             (African American)                                        



             (test code =                                        



             2516410481)                                         

 

             CAROLYN (test code = CAROLYN) Association of                           



                          Glomerular Filtration                           



                          Rate (GFR) and Staging                           



                          of Kidney Disease*                           



                          +----------------------                           



                          -+---------------------                           



                          +----------------------                           



                          ---+| GFR (mL/min/1.73                           



                          m2) ?| With Kidney                           



                          Damage ?| ?Without                           



                          Kidney                                 



                          Damage+----------------                           



                          -------+---------------                           



                          ------+----------------                           



                          ---------+| ?>90 ? ? ?                           



                          ? ? ? ? ? ?| ?Stage one                           



                          ? ? ? ? ?| ? Normal ? ?                           



                          ? ? ? ? ?                              



                          ?+---------------------                           



                          --+--------------------                           



                          -+---------------------                           



                          ----+| ?60-89 ? ? ? ? ?                           



                          ? ? ?| ?Stage two ? ? ?                           



                          ? ?| ? Decreased GFR ?                           



                          ? ? ?                                  



                          +----------------------                           



                          -+---------------------                           



                          +----------------------                           



                          ---+| ?30-59 ? ? ? ? ?                           



                          ? ? ?| ?Stage three ? ?                           



                          ? ?| ? Stage three ? ?                           



                          ? ? ?                                  



                          +----------------------                           



                          -+---------------------                           



                          +----------------------                           



                          ---+| ?15-29 ? ? ? ? ?                           



                          ? ? ?| ?Stage four ? ?                           



                          ? ? | ? Stage four ? ?                           



                          ? ? ?                                  



                          ?+---------------------                           



                          --+--------------------                           



                          -+---------------------                           



                          ----+| ?<15 (or                           



                          dialysis) ? ?| ?Stage                           



                          five ? ? ? ? | ? Stage                           



                          five ? ? ? ? ?                           



                          ?+---------------------                           



                          --+--------------------                           



                          -+---------------------                           



                          ----+ *Each stage                           



                          assumes the associated                           



                          GFR level has been in                           



                          effect for at least                           



                          three months. ?Stages 1                           



                          to 5, with or without                           



                          kidney disease,                           



                          indicate chronic kidney                           



                          disease. Notes:                           



                          Determination of stages                           



                          one and two (with eGFR                           



                          >59mL/min/1.73 m2)                           



                          requires estimation of                           



                          kidney damage for at                           



                          least three months as                           



                          defined by structural                           



                          or functional                           



                          abnormalities of the                           



                          kidney, manifested by                           



                          either:Pathological                           



                          abnormalities or                           



                          Markers of kidney                           



                          damage (including                           



                          abnormalities in the                           



                          composition of the                           



                          blood or urine or                           



                          abnormalities in                           



                          imaging tests).                           



[facility]

## 2022-08-04 NOTE — EKG
Test Date:    2022-08-03               Test Time:    20:34:57

Technician:   CHANDU                                     

                                                     

MEASUREMENT RESULTS:                                       

Intervals:                                           

Rate:         65                                     

WV:           114                                    

QRSD:         86                                     

QT:           398                                    

QTc:          413                                    

Axis:                                                

P:            35                                     

WV:           114                                    

QRS:          70                                     

T:            54                                     

                                                     

INTERPRETIVE STATEMENTS:                                       

                                                     

Undetermined rhythm

Otherwise normal ECG

No previous ECG available for comparison



Electronically Signed On 08-04-22 10:31:21 CDT by Davon Hernández appt made

## 2022-08-04 NOTE — P.DS
Admission Date: 08/04/22


Discharge Date: 08/04/22


Disposition: ROUTINE DISCHARGE


Discharge Condition: GOOD


Reason for Admission: Seizures


Consultations: 





Neuro - Sarina





Brief History of Present Illness: 








21-year-old female history of GERD presents emergency department for seizure.  

She was here earlier today for first-time seizure significant other bedside 

reports that she had nausea and vomiting of the day today he gave her a 

milligrams of Zofran p.o. she took a nap after waking from a nap she took a 

shower followed by having a 1 minute long tonic-clonic seizure witnessed by 

family EMS was called and patient was transported to the hospital she was 

evaluated here in the emergency department her initial labs were significant for

a glucose of 303 white blood cell count of 13.8 UDS positive for THC negative 

salicylate, a lumbar puncture was performed which revealed elevated CSF glucose 

but otherwise was negative urinalysis with 2+ glucose 1+ ketones patient given 

IV fluids repeat chemistry was performed her blood sugar improved to 95 she is 

feeling better and had no more seizure-like activity so she was discharged home 

to follow-up with neurology outpatient, after returning home patient lay down in

bed had another approximately 1 minute long tonic seizure witnessed again by 

family and was brought back to the emergency department.  Patient appears to be 

mildly postictal at this time repeated the chemistry which was unremarkable ED 

provider wishes to admit for further evaluation and management of new onset 

seizures.





Hospital Course: 





Problem List


New onset seizure


h/o depression


marijuana use








Patient's symptoms most consistent with seizure activity. CT head and MRI head 

(epilepsy protocol) were negative for any acute findings.


Neurology was consulted, recommended treatment with keppra. Patient was loaded 

with 1g and continued on 500mg twice daily.


She remained stable, had improvement of her symptoms and no further seizure 

activity.


She was deemed stable for discharge with prescription for keppra and to follow 

up with Dr. Branch in ~3-4 weeks.


Lumbar puncture was performed, and negative for infection, she remained 

afebrile.





Patient had first witness seizure activity ~45 minutes after taking zofran. This

was first time she took this medication. Possibility of extrapyramidal side 

effect from this; however presentation more consistent with seizure. Patient was

advised to avoid / extra caution with zofran for now as precaution.


On further discussion, SO recalled patient had urinated while in bed, which 

patient did not recall, and may have had an unwitnessed seizure - before taking 

zofran.





Vital Signs/Physical Exam: 














Temp Pulse Resp BP Pulse Ox


 


 98.2 F   67   18   130/100 H  100 


 


 08/04/22 16:21  08/04/22 16:21  08/04/22 16:21  08/04/22 16:21  08/04/22 12:00








General: Alert, In no apparent distress


HEENT: EOMI, Sclerae nonicteric


Neck: Supple, No LAD


Respiratory: Clear to auscultation bilaterally, Normal air movement


Cardiovascular: No edema, Regular rate/rhythm


Gastrointestinal: Soft and benign, Non-distended, No tenderness


Musculoskeletal: No contractures, No tenderness


Integumentary: No rashes, No tenderness/swelling


Neurological: Normal speech, Normal strength at 5/5 x4 extr, Normal affect


Laboratory Data at Discharge: 














WBC  16.6 K/uL (4.3-10.9)  H D 08/04/22  06:00    


 


Hgb  12.3 g/dL (12.0-15.0)   08/04/22  06:00    


 


Hct  36.4 % (36.0-45.0)   08/04/22  06:00    


 


Plt Count  310 K/uL (152-406)   08/04/22  06:00    


 


Sodium  135 mmol/L (136-145)  L  08/04/22  03:51    


 


Potassium  3.7 mmol/L (3.5-5.1)   08/04/22  03:51    


 


BUN  8 mg/dL (7-18)   08/04/22  03:51    


 


Creatinine  0.85 mg/dL (0.55-1.3)   08/04/22  03:51    


 


Glucose  164 mg/dL ()  H  08/04/22  03:51    


 


Phosphorus  2.4 mg/dL (2.5-4.9)  L  08/04/22  09:21    


 


Magnesium  1.8 mg/dL (1.8-2.4)   08/04/22  09:21    








Home Medications: 








levETIRAcetam [Keppra Tab] 500 mg PO BID 30 Days #60 tab 08/04/22 





New Medications: 


levETIRAcetam [Keppra Tab] 500 mg PO BID 30 Days #60 tab


Physician Discharge Instructions: 


Patient's symptoms most consistent with seizure activity. CT head and MRI head 

(epilepsy protocol) were negative for any acute findings.


Neurology was consulted, recommended treatment with keppra. Patient was loaded 

with 1g and continued on 500mg twice daily.


She remained stable, had improvement of her symptoms and no further seizure 

activity.


She was deemed stable for discharge with prescription for keppra and to follow 

up with Dr. Branch in ~3-4 weeks.


Lumbar puncture was performed, and negative for infection, she remained 

afebrile.





Followup: 


Unknown,U [Primary Care Provider] - 


Time spent managing pt's care (in minutes): 45

## 2022-08-04 NOTE — RAD REPORT
EXAM DESCRIPTION:  MRI - Brain W/Wo Cont - 8/4/2022 11:55 am

 

CLINICAL HISTORY:  epilepsy protocol

Headache, drowsiness, seizure

 

COMPARISON:  Head Brain Wo Cont dated 8/3/2022

 

TECHNIQUE:  Multi-sequence, multiplanar MR imaging of the brain was performed with contrast.

 

FINDINGS:  No intracranial hemorrhage, hydrocephalus, or extra-axial fluid collection. No edema or sh
ift of midline structures. No intracranial mass. DWI is negative for acute CVA.

 

The midline structures are normally formed. Mild polypoid thickening right maxillary antrum. The para
nasal sinuses and mastoids are otherwise clear.

 

Post-contrast images show no abnormal enhancement to suggest tumor or infection.

 

IMPRESSION:  No potential seizure focus is identified on this examination.

 

No pathologic post-contrast enhancement suspected.

## 2022-08-04 NOTE — ER
Nurse's Notes                                                                                     

 [facility]                                                                 

Name: Clarissa Anand                                                                            

Age: 21 yrs                                                                                       

Sex: Female                                                                                       

: 2000                                                                                   

MRN: [id number]                                                                                   

Arrival Date: 2022                                                                          

Time: 03:08                                                                                       

Account#: [id number]                                                                            

Bed 14                                                                                            

Private MD:                                                                                       

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         

  epilepticus                                                                                     

                                                                                                  

Presentation:                                                                                     

                                                                                             

03:25 Chief complaint: Spouse and/or significant other states: pt was in bed and leaned over  sm5 

      the side of the bed and "had a seizure" boyfriend states she was clenching her fists,       

      diaphoretic, drooling from the mouth. boyfriend states seizure lasted about 1 min and a     

      half. Coronavirus screen: At this time, the client does not indicate any symptoms           

      associated with coronavirus-19. Ebola Screen: No symptoms or risks identified at this       

      time.                                                                                       

03:25 Method Of Arrival: Ambulatory                                                           5 

03:28 Initial Sepsis Screen: Does the patient meet any 2 criteria? No. Patient's initial      5 

      sepsis screen is negative. Does the patient have a suspected source of infection? No.       

      Patient's initial sepsis screen is negative. Risk Assessment: Do you want to hurt           

      yourself or someone else? Patient reports no desire to harm self or others. Onset of        

      symptoms was 2022.                                                               

03:28 Acuity: HANSA 3                                                                           sm5 

                                                                                                  

Triage Assessment:                                                                                

03:27 General: Appears in no apparent distress. Behavior is cooperative. Pain: Complains of   sm5 

      pain in head Pain currently is 8 out of 10 on a pain scale. Neuro: Level of                 

      Consciousness is awake, alert, obeys commands, Oriented to person, place, time,             

      situation. Cardiovascular: Capillary refill < 3 seconds Patient's skin is warm and dry.     

      Respiratory: Airway is patent Trachea midline Respiratory effort is even, unlabored.        

                                                                                                  

Historical:                                                                                       

- Allergies:                                                                                      

03:27 PENICILLINS;                                                                            sm5 

                                                                                                  

- Immunization history:: Adult Immunizations up to date.                                          

- Social history:: Smoking status: unknown.                                                       

- Family history:: not pertinent.                                                                 

- Hospitalizations: : No recent hospitalization is reported.                                      

                                                                                                  

                                                                                                  

Screenin:28 Abuse screen: Denies threats or abuse. Denies injuries from another. Nutritional        sm5 

      screening: No deficits noted. Tuberculosis screening: No symptoms or risk factors           

      identified. Fall Risk None identified.                                                      

                                                                                                  

Assessment:                                                                                       

03:40 Reassessment: see triage assessment.                                                    5 

                                                                                                  

Vital Signs:                                                                                      

03:25  / 100; Pulse 67; Resp 18; Temp 98.2(TE); Pulse Ox 100% on R/A; Pain 8/10;        sm5 

03:27 Weight 54.43 kg; Height 5 ft. 5 in. (165.10 cm);                                        sm5 

03:27 Body Mass Index 19.97 (54.43 kg, 165.10 cm)                                             5 

                                                                                                  

Everett Coma Score:                                                                               

03:27 Eye Response: spontaneous(4). Verbal Response: oriented(5). Motor Response: obeys       5 

      commands(6). Total: 15.                                                                     

                                                                                                  

ED Course:                                                                                        

03:08 Patient arrived in ED.                                                                  ja2 

03:09 Krzysztof Moore MD is Attending Physician.                                                rn  

03:25 Patricia Granados RN is Primary Nurse.                                                      5 

03:27 Kenyon Mooer MD is Hospitalizing Provider.                                           rn  

03:28 Triage completed.                                                                       5 

03:28 Arm band placed on right wrist.                                                         5 

03:28 Patient has correct armband on for positive identification. Bed in low position. Call   5 

      light in reach. Side rails up X2. Seizure precautions initiated. Client placed on           

      continuous cardiac and pulse oximetry monitoring. NIBP monitoring applied.                  

03:56 Inserted saline lock: 20 gauge in right forearm, using aseptic technique. Blood         4 

      collected.                                                                                  

03:59 BMP Sent.                                                                               5 

                                                                                                  

Administered Medications:                                                                         

04:04 Drug: Keppra (levETIRAcetam) 1000 mg Route: IV; Rate: calculated rate; Site: right      5 

      forearm;                                                                                    

04:17 Follow up: IV Status: Completed infusion; IV Intake: 100ml                              [facility] 

04:17 Drug: Lactated Ringers Solution 1000 ml Route: IV; Rate: 100 ml/hr; Site: right forearm;5 

                                                                                                  

                                                                                                  

Medication:                                                                                       

04:00 VIS not applicable for this client.                                                     5 

                                                                                                  

Intake:                                                                                           

04:17 IV: 100ml; Total: 100ml.                                                                5 

                                                                                                  

Outcome:                                                                                          

03:27 Decision to Hospitalize by Provider.                                                    rn  

16:15 Patient left the ED.                                                                    [facility] 

                                                                                                  

Signatures:                                                                                       

Krzysztof Moore MD MD rn Alexander, Jessica ja2 Hastedt, Jennifer, RN RN   6                                                  

Patricia Granados RN RN sm5                                                  

Ankur Briggs RN RN   [facility]                                                  

                                                                                                  

**************************************************************************************************

## 2022-08-04 NOTE — EDPHYS
Physician Documentation                                                                           

 [facility]                                                                 

Name: Clarissa Anand                                                                            

Age: 21 yrs                                                                                       

Sex: Female                                                                                       

: 2000                                                                                   

MRN: [id number]                                                                                   

Arrival Date: 2022                                                                          

Time: 03:08                                                                                       

Account#: [id number]                                                                            

Bed 14                                                                                            

Private MD:                                                                                       

ED Physician Krzysztof Moore                                                                         

HPI:                                                                                              

                                                                                             

03:21 This 21 yrs old Female presents to ER via Unassigned with complaints of Probable        rn  

      Seizure.                                                                                    

03:21 The patient presents after having a single isolated seizure, that lasted 30 second(s).  rn  

      Seizure onset: just prior to arrival. Seizure Hx: Original onset:. Associated injury:       

      The patient did not suffer any apparent associated injury. Current symptoms: Currently,     

      the patient is not experiencing any symptoms. The patient has experienced a previous        

      episode, yesterday. The patient has been recently seen at the [facility] Emergency Department, today, by me. Pt returns with another seizure, 30       

      seconds. Workup earlier including ct head and LP neg. .                                     

                                                                                                  

Historical:                                                                                       

- Allergies:                                                                                      

03:27 PENICILLINS;                                                                            sm5 

                                                                                                  

- Immunization history:: Adult Immunizations up to date.                                          

- Social history:: Smoking status: unknown.                                                       

- Family history:: not pertinent.                                                                 

- Hospitalizations: : No recent hospitalization is reported.                                      

                                                                                                  

                                                                                                  

ROS:                                                                                              

03:21 Constitutional: Negative for fever, chills, and weight loss, Eyes: Negative for injury, rn  

      pain, redness, and discharge, Neck: Negative for injury, pain, and swelling,                

      Cardiovascular: Negative for chest pain, palpitations, and edema, Respiratory: Negative     

      for shortness of breath, cough, wheezing, and pleuritic chest pain, Abdomen/GI:             

      Negative for abdominal pain, nausea, vomiting, diarrhea, and constipation, Back:            

      Negative for injury and pain, MS/Extremity: Negative for injury and deformity, Skin:        

      Negative for injury, rash, and discoloration, Neuro: Negative for headache, weakness,       

      numbness, tingling                                                                          

                                                                                                  

Exam:                                                                                             

03:21 Constitutional:  This is a well developed, well nourished patient who is awake, alert,  rn  

      and in no acute distress. Head/Face:  Normocephalic, atraumatic. Eyes:  Periorbital         

      areas with no swelling, redness, or edema. Cardiovascular:  Regular rate and rhythm.        

      No pulse deficits. Respiratory:  No increased work of breathing, no retractions or          

      nasal flaring. Abdomen/GI:  Soft, non-tender Skin:  Warm, dry  MS/ Extremity:  Pulses       

      equal, no cyanosis.  Neuro:  Awake and alert, GCS 15, oriented to person, place, time,      

      and situation.  Cranial nerves II-XII grossly intact.  Motor strength 5/5 in all            

      extremities.  Sensory grossly intact.  Cerebellar exam normal.                              

                                                                                                  

Vital Signs:                                                                                      

03:25  / 100; Pulse 67; Resp 18; Temp 98.2(TE); Pulse Ox 100% on R/A; Pain 8/10;        sm5 

03:27 Weight 54.43 kg; Height 5 ft. 5 in. (165.10 cm);                                        sm5 

03:27 Body Mass Index 19.97 (54.43 kg, 165.10 cm)                                             sm5 

                                                                                                  

Eileen Coma Score:                                                                               

03:27 Eye Response: spontaneous(4). Verbal Response: oriented(5). Motor Response: obeys       sm5 

      commands(6). Total: 15.                                                                     

                                                                                                  

MDM:                                                                                              

03:09 Patient medically screened.                                                             rn  

03:21 Differential diagnosis: cardiac arrhythmia, seizure. Data reviewed: vital signs, nurses rn  

      notes, old medical records, lab test result(s), and as a result, I will admit patient.      

      Counseling: I had a detailed discussion with the patient and/or guardian regarding: the     

      historical points, exam findings, and any diagnostic results supporting the                 

      discharge/admit diagnosis, lab results, radiology results, the need for further work-up     

      and treatment in the hospital. Response to treatment: the patient's condition has           

      returned to base line, the patient is now symptom free, and as a result, I will admit       

      patient. Admission orders: after a detailed discussion of the patient's condition and       

      case, the admit orders are written by me. ED course: Pt returns with 2nd seizure in 24      

      hours. Will now admit for further w/u, started on keppra..                                  

                                                                                                  

                                                                                             

03:20 Order name: BMP; Complete Time: 04:20                                                   rn  

                                                                                             

06:16 Order name: CBC with Automated Diff                                                     EDMS

                                                                                             

09:14 Order name: RAD                                                                         EDMS

                                                                                             

09:43 Order name: Phosphorus                                                                  EDMS

                                                                                             

09:43 Order name: Magnesium                                                                   EDMS

                                                                                             

12:29 Order name: Glucose, Ancillary Testing                                                  EDMS

                                                                                             

03:20 Order name: IV Start; Complete Time: 03:59                                              rn  

                                                                                             

03:21 Order name: Cardiac monitoring; Complete Time: 03:29                                    rn  

                                                                                             

03:21 Order name: O2 Sat Monitoring; Complete Time: 03:29                                     rn  

                                                                                             

12:04 Order name: MRI                                                                         EDMS

                                                                                                  

Administered Medications:                                                                         

04:04 Drug: Keppra (levETIRAcetam) 1000 mg Route: IV; Rate: calculated rate; Site: right      sm5 

      forearm;                                                                                    

04:17 Follow up: IV Status: Completed infusion; IV Intake: 100ml                              sm5 

04:17 Drug: Lactated Ringers Solution 1000 ml Route: IV; Rate: 100 ml/hr; Site: right forearm;sm5 

                                                                                                  

                                                                                                  

Disposition Summary:                                                                              

22 03:27                                                                                    

Hospitalization Ordered                                                                           

      Hospitalization Status: Inpatient Admission                                             rn  

      Provider: Kenyon Moore                                                                rn  

      Condition: Stable                                                                       rn  

      Problem: new                                                                            rn  

      Symptoms: have improved                                                                 rn  

      Bed/Room Type: Standard                                                                 rn  

      Location: [facility] ER HOLD(22 04:34)                                                  cg  

      Room Assignment: ERHOLD-(22 04:34)                                                cg  

      Diagnosis                                                                                   

        - Epileptic seizures related to external causes, not intractable, without status      rn  

      epilepticus                                                                                 

      Forms:                                                                                      

        - Medication Reconciliation Form                                                      rn  

        - SBAR form                                                                           rn  

Signatures:                                                                                       

Dispatcher [employer]                           EDMS                                                 

Krzysztof Moore MD MD rn Attema, Lee, IOANAP-C                      FNP-Cla1                                                  

Claudia Fuentes RN                       RN   Patricia Weber RN                        RN   5                                                  

                                                                                                  

Corrections: (The following items were deleted from the chart)                                    

04:34 03:27 Telemetry/MedSurg (Inpatient) rn                                                  cg  

04:34 03:27 rn                                                                                cg  

                                                                                                  

**************************************************************************************************

## 2022-08-04 NOTE — P.HP
Certification for Inpatient


Patient admitted to: Observation


With expected LOS: <2 Midnights


Patient will require the following post-hospital care: None


Practitioner: I am a practitioner with admitting privileges, knowledge of 

patient current condition, hospital course, and medical plan of care.


Services: Services provided to patient in accordance with Admission requirements

found in Title 42 Section 412.3 of the Code of Federal Regulations





Patient History


Date of Service: 08/04/22


Reason for admission: Seizures


History of Present Illness: 





21-year-old female history of GERD presents emergency department for seizure.  

She was here earlier today for first-time seizure significant other bedside 

reports that she had nausea and vomiting of the day today he gave her a 

milligrams of Zofran p.o. she took a nap after waking from a nap she took a 

shower followed by having a 1 minute long tonic-clonic seizure witnessed by 

family EMS was called and patient was transported to the hospital she was 

evaluated here in the emergency department her initial labs were significant for

a glucose of 303 white blood cell count of 13.8 UDS positive for THC negative 

salicylate, a lumbar puncture was performed which revealed elevated CSF glucose 

but otherwise was negative urinalysis with 2+ glucose 1+ ketones patient given 

IV fluids repeat chemistry was performed her blood sugar improved to 95 she is 

feeling better and had no more seizure-like activity so she was discharged home 

to follow-up with neurology outpatient, after returning home patient lay down in

bed had another approximately 1 minute long tonic-clonic seizure witnessed again

by family and was brought back to the emergency department.  Patient appears to 

be mildly postictal at this time repeated the chemistry which was unremarkable 

ED provider wishes to admit for further evaluation and management of new onset 

seizures.





- Past Medical/Surgical History


-: GERD


-: None


Psychosocial/ Personal History: Patient is a , lives at home with her

father





- Family History


Family History: Reviewed- Non-Contributory





- Family History


  ** Father


-: Diabetes





- Social History


Smoking Status: Never smoker


Alcohol use: No


CD- Drugs: No


Caffeine use: Yes


Place of Residence: Home





Review of Systems


10-point ROS is otherwise unremarkable


Neurological: Seizures, As per HPI





Physical Examination





- Physical Exam


General: Alert, In no apparent distress, Oriented x3


HEENT: Atraumatic, PERRLA, Mucous membr. moist/pink, EOMI, Sclerae nonicteric


Neck: Supple, 2+ carotid pulse no bruit, No LAD, Without JVD or thyroid 

abnormality


Respiratory: Clear to auscultation bilaterally, Normal air movement


Cardiovascular: Regular rate/rhythm, Normal S1 S2


Capillary refill: <2 Seconds


Gastrointestinal: Normal bowel sounds, No tenderness


Musculoskeletal: No tenderness


Integumentary: No rashes


Neurological: Normal gait, Normal speech, Normal strength at 5/5 x4 extr, Normal

tone, Normal affect


Lymphatics: No axilla or inguinal lymphadenopathy





- Studies


Laboratory Data (last 24 hrs)





08/04/22 03:51: Sodium 135 L, Potassium 3.7, BUN 8, Creatinine 0.85, Glucose 164

H








Assessment and Plan





- Plan


Assessment:


New onset seizures


Hyperglycemia





Plan:


New onset seizures: CT head brain negative for acute findings today, labs 

overall unremarkable aside from hyperglycemia, patient denies recreational drug 

use did test positive for THC.  Denies any family or personal history of seizure

disorder.  Seizure precautions placed neurology consult in place, given 1 g of 

Keppra IV in the emergency department we will continue Keppra 500 p.o. twice 

daily for time being EEG ordered.  Appreciate further input from neurology.


Hyperglycemia: Not a known diabetic after her procedure when she came to the 

emergency department her sugar was around 300 it was corrected with just IV 

fluids mild hyperglycemia currently.  Will obtain A1c, ACS Accu-Chek, sliding 

scale insulin.  Suspect underlying diabetes.





DVT PPX: Lovenox


Code status: Full


Discharge Plan: Home


Plan to discharge in: 24 Hours





- Advance Directives


Does patient have a Living Will: No


Does patient have a Durable POA for Healthcare: No





- Code Status/Comfort Care


Code Status Assessed: Yes (Full code)


Critical Care: No


Time Spent Managing Pts Care (In Minutes): 70

## 2022-08-05 NOTE — EEG
CHART:  [id number]

TEST ID#:  [id number]

DATE OF STUDY:  08-



THE EEG WAS RECORDED PORTABLE IN THE EMERGENCY ROOM ON A 17 CHANNEL MACHINE.  ELECTRODES 
WERE APPLIED IN THE USUAL MANNER USING THE INTERNATIONAL 10-20 SYSTEM.



THE WAKING BACKGROUND RHYTHM IN THIS RECORD CONSISTS OF WELL DEVELOPED AND WELL ORGANIZED 
WAVES OF 9 HZ., MAXIMAL IN THE POSTERIOR HEAD REGIONS WHICH ATTENUATE NORMALLY WITH EYE 
OPENING.  LOW-VOLTAGE 18-22 HZ ACTIVITY IS EXPRESSED IN THE FRONTAL REGIONS.



THERE ARE NO FOCAL OR LATERALIZING FEATURES.  NO EPILEPTIFORM ACTIVITY APPEARS.  

SLEEP OCCURRED.  IN ADDITION NORMAL SLEEP PATTERNS ARE PRESENT.



HYPERVENTILATION WAS NOT PERFORMED.



PHOTIC STIMULATION PRODUCED FAIR DRIVING BILATERALLY.



IMPRESSION:  NORMAL EEG FOR THE AGE OF THE PATIENT IN [place], DROWSINESS AND SLEEP.

## 2022-08-05 NOTE — EKG
Test Date:    2022-08-03               Test Time:    20:35:44

Technician:   CHANDU                                     

                                                     

MEASUREMENT RESULTS:                                       

Intervals:                                           

Rate:         62                                     

OH:           112                                    

QRSD:         84                                     

QT:           410                                    

QTc:          416                                    

Axis:                                                

P:            53                                     

OH:           112                                    

QRS:          70                                     

T:            56                                     

                                                     

INTERPRETIVE STATEMENTS:                                       

                                                     

Normal sinus rhythm with sinus arrhythmia

Normal ECG

Compared to ECG 08/03/2022 20:34:57

No significant changes



Electronically Signed On 08-05-22 15:08:35 CDT by Feroz Thompson

## 2023-01-25 ENCOUNTER — HOSPITAL ENCOUNTER (EMERGENCY)
Dept: HOSPITAL 97 - ER | Age: 23
Discharge: ANOTHER HOSPITAL | End: 2023-01-25
Payer: COMMERCIAL

## 2023-01-25 VITALS — OXYGEN SATURATION: 96 % | SYSTOLIC BLOOD PRESSURE: 111 MMHG | DIASTOLIC BLOOD PRESSURE: 96 MMHG

## 2023-01-25 VITALS — TEMPERATURE: 97.7 F

## 2023-01-25 DIAGNOSIS — Z88.0: ICD-10-CM

## 2023-01-25 DIAGNOSIS — G40.901: ICD-10-CM

## 2023-01-25 DIAGNOSIS — E87.6: ICD-10-CM

## 2023-01-25 DIAGNOSIS — Z20.822: ICD-10-CM

## 2023-01-25 DIAGNOSIS — J96.00: Primary | ICD-10-CM

## 2023-01-25 LAB
ALBUMIN SERPL BCP-MCNC: 2.5 G/DL (ref 3.4–5)
ALP SERPL-CCNC: 69 U/L (ref 45–117)
ALT SERPL W P-5'-P-CCNC: 44 U/L (ref 13–56)
AST SERPL W P-5'-P-CCNC: 35 U/L (ref 15–37)
BUN BLD-MCNC: 12 MG/DL (ref 7–18)
BUN BLD-MCNC: 15 MG/DL (ref 7–18)
COHGB MFR BLDA: 0.2 % (ref 0–1.5)
COHGB MFR BLDA: 0.6 % (ref 0–1.5)
GLUCOSE SERPLBLD-MCNC: 220 MG/DL (ref 74–106)
GLUCOSE SERPLBLD-MCNC: 241 MG/DL (ref 74–106)
HCT VFR BLD CALC: 31 % (ref 36–45)
LYMPHOCYTES # SPEC AUTO: 7 K/UL (ref 0.7–4.9)
MAGNESIUM SERPL-MCNC: 2 MG/DL (ref 1.6–2.4)
MCV RBC: 102.4 FL (ref 80–100)
METHAMPHET UR QL SCN: NEGATIVE
MORPHOLOGY BLD-IMP: (no result)
OXYHGB MFR BLDA: 91.3 % (ref 94–97)
OXYHGB MFR BLDA: 96.6 % (ref 94–97)
PMV BLD: 7.3 FL (ref 7.6–11.3)
POTASSIUM SERPL-SCNC: 2.6 MMOL/L (ref 3.5–5.1)
POTASSIUM SERPL-SCNC: 3.7 MMOL/L (ref 3.5–5.1)
RBC # BLD: 3.03 M/UL (ref 3.86–4.86)
SAO2 % BLDA: 93.2 % (ref 92–98.5)
SAO2 % BLDA: 98.6 % (ref 92–98.5)
THC SERPL-MCNC: POSITIVE NG/ML

## 2023-01-25 PROCEDURE — 71045 X-RAY EXAM CHEST 1 VIEW: CPT

## 2023-01-25 PROCEDURE — 81001 URINALYSIS AUTO W/SCOPE: CPT

## 2023-01-25 PROCEDURE — 85025 COMPLETE CBC W/AUTO DIFF WBC: CPT

## 2023-01-25 PROCEDURE — 82805 BLOOD GASES W/O2 SATURATION: CPT

## 2023-01-25 PROCEDURE — 80053 COMPREHEN METABOLIC PANEL: CPT

## 2023-01-25 PROCEDURE — 82010 KETONE BODYS QUAN: CPT

## 2023-01-25 PROCEDURE — 83605 ASSAY OF LACTIC ACID: CPT

## 2023-01-25 PROCEDURE — 94002 VENT MGMT INPAT INIT DAY: CPT

## 2023-01-25 PROCEDURE — 84703 CHORIONIC GONADOTROPIN ASSAY: CPT

## 2023-01-25 PROCEDURE — 82947 ASSAY GLUCOSE BLOOD QUANT: CPT

## 2023-01-25 PROCEDURE — 80307 DRUG TEST PRSMV CHEM ANLYZR: CPT

## 2023-01-25 PROCEDURE — 87811 SARS-COV-2 COVID19 W/OPTIC: CPT

## 2023-01-25 PROCEDURE — 83735 ASSAY OF MAGNESIUM: CPT

## 2023-01-25 PROCEDURE — 36415 COLL VENOUS BLD VENIPUNCTURE: CPT

## 2023-01-25 PROCEDURE — 70450 CT HEAD/BRAIN W/O DYE: CPT

## 2023-01-25 PROCEDURE — 80048 BASIC METABOLIC PNL TOTAL CA: CPT

## 2023-01-25 NOTE — RAD REPORT
EXAM DESCRIPTION:  Della Single View1/25/2023 8:01 am

 

CLINICAL HISTORY:  Device placement endotracheal tube placement

 

COMPARISON:  August 2022

 

FINDINGS:  Endotracheal tube has its tip at level of the aortic arch. Nasogastric tube is present sto
mach.

 

Patient is rotated.

 

 The lungs appear clear of acute infiltrate. The heart is normal size

## 2023-01-25 NOTE — RAD REPORT
EXAM DESCRIPTION:  CT - Head Brain Wo Cont - 1/25/2023 8:21 am

 

CLINICAL HISTORY:  Seizure

 

COMPARISON:  August 2022

 

TECHNIQUE:  Computed axial tomography of the head was obtained. IV contrast was not requested.

 

All CT scans are performed using dose optimization technique as appropriate and may include automated
 exposure control or mA/KV adjustment according to patient size.

 

FINDINGS:  An intracranial  bleed is not seen .

 

The ventricles are normal in caliber.

 

No significant hypodense areas within the brain visualized

 

No extra-axial fluid collection is noted.

 

IMPRESSION:  No acute intracranial abnormality is seen. If patient's symptoms persist  MRI of the bra
in would be recommended.

## 2023-01-25 NOTE — ER
Nurse's Notes                                                                                     

 [facility]                                                                 

Name: Clarissa Anand                                                                            

Age: 22 yrs                                                                                       

Sex: Female                                                                                       

: 2000                                                                                   

MRN: [id number]                                                                                   

Arrival Date: 2023                                                                          

Time: 07:07                                                                                       

Account#: [id number]                                                                            

Bed 3                                                                                             

Private MD:                                                                                       

Diagnosis: Epilepsy, unspecified, not intractable, with status epilepticus;Hypokalemia;Acute      

  respiratory failure                                                                             

                                                                                                  

Presentation:                                                                                     

                                                                                             

07:05 Chief complaint: EMS states: toned out for status epilepticus X 45 minutes, hx of       iw  

      seizures recently taken off Keppra 3 days ago , arrives to ER, obtunded, assisted           

      respirations by EMS via ambu bag. Coronavirus screen: At this time, the client does not     

      indicate any symptoms associated with coronavirus-19. Ebola Screen: Patient negative        

      for fever greater than or equal to 101.5 degrees Fahrenheit, and additional compatible      

      Ebola Virus Disease symptoms Patient denies exposure to infectious person. Patient          

      denies travel to an Ebola-affected area in the 21 days before illness onset. No             

      symptoms or risks identified at this time. Risk Assessment: Do you want to hurt             

      yourself or someone else? Patient reports no desire to harm self or others.                 

07:05 Method Of Arrival: EMS: [facility]                                                    iw  

07:05 Acuity: HANSA 1                                                                           iw  

07:47 Initial Sepsis Screen: Does the patient meet any 2 criteria? No. Patient's initial      ph  

      sepsis screen is negative. Does the patient have a suspected source of infection? No.       

      Patient's initial sepsis screen is negative. Onset of symptoms was 2023.        

                                                                                                  

Historical:                                                                                       

- Allergies:                                                                                      

07:47 PENICILLINS;                                                                            ph  

- PMHx:                                                                                           

07:47 Seizure;                                                                                ph  

                                                                                                  

- Immunization history:: Adult Immunizations unknown.                                             

- Social history:: Smoking status: unknown.                                                       

- Unable to obtain history due to: obtunded state.                                                

                                                                                                  

                                                                                                  

Screenin:46 [facility] ED Fall Risk Assessment (Adult) History of falling in the last 3 months,       ph  

      including since admission No falls in past 3 months (0 pts) Confusion or Disorientation     

      No (0 pts) Intoxicated or Sedated Yes (3 pts) Impaired Gait No (0 pts) Mobility Assist      

      Device Used No (0 pt) Altered Elimination No (0 pt) Score/Fall Risk Level 3 or more         

      points = High Risk Oriented to surroundings, Maintained a safe environment, Hourly          

      rounding (assess needs \T\ fall precautionary measures) done. Abuse screen: Denies          

      threats or abuse. Denies injuries from another. Nutritional screening: No deficits          

      noted. Tuberculosis screening: No symptoms or risk factors identified.                      

                                                                                                  

Assessment:                                                                                       

07:05 General: Appears ill, well developed, Behavior is unresponsive. Pain: Unable to use     iw  

      pain scale. Patient is unresponsive. Neuro: Spaulding Agitation-Sedation Scale (RASS):       

      -5 Unarousable Level of Consciousness is post ictal, unresponsive, Oriented to none.        

      Cardiovascular: Capillary refill < 3 seconds in bilateral fingers Patient's skin is         

      warm and dry. Respiratory: Airway is compromised via nasal trumpet Respiratory effort       

      is labored, gasping, Respiratory pattern is agonal. GI: Abdomen is flat, non-distended.     

      Derm: Skin is intact, is healthy with good turgor.                                          

07:11 Reassessment: Dr. Mcdaniel at bedside, set up for intubation , RT at bedside.         iw  

07:58 Reassessment: pt successfully intubated, mohan in place, NGT in place, propofol         iw  

      infusing at 20 mcg/kg/min awaiting CT.                                                      

08:28 Reassessment: pt back from CT, family at bedside speaking to Dr. Mcdaniel.            iw  

10:30 Reassessment: Pt noted to be restless, moving around, SPO2 88%, ET Tube suctioned x2    jl7 

      with increase in O2 to 90%, propofol infusion increased to 40 mcg/kg/min. Pt O2             

      increased to 94%.                                                                           

10:54 Reassessment: Patient appears in no apparent distress at this time. Report called to RN ph  

      at [facility], transfer form signed by father.                             

11:40 Reassessment: report given to [facility].                                         emily 

11:55 Reassessment: pt began vomiting bile and fighting ET tube. Pt suctioned and VO from Dr. emily Mcdaniel to give Zofran and Fentanyl.                                                     

                                                                                                  

Vital Signs:                                                                                      

07:23 Pulse Ox 100% on ETT vent; Weight 49.9 kg; Height 5 ft. 4 in. (162.56 cm);              iw  

07:45  / 99; Pulse 92; Resp 23; Pulse Ox 100% on ETT vent;                              ph  

07:57  / 63; Pulse 82; Resp 16 A; Pulse Ox 100% on ETT vent;                            iw  

08:28  / 60; Pulse 82; Resp 20; Pulse Ox 100% on ETT vent;                              iw  

10:38  / 64; Pulse 85; Resp 18; Temp 97.7; Pulse Ox 93% on ETT vent;                    ph  

11:41  / 83; Pulse 84; Resp 26; Pulse Ox 97% on ETT vent;                               mb9 

12:01  / 96; Pulse 101; Resp 20; Pulse Ox 96% on ETT vent;                              mb9 

07:23 Body Mass Index 18.88 (49.90 kg, 162.56 cm)                                             iw  

                                                                                                  

Eileen Coma Score:                                                                               

07:07 Eye Response: none(1). Verbal Response: none(1). Motor Response: withdraws from         jl7 

      pain(4). Total: 6.                                                                          

                                                                                                  

ED Course:                                                                                        

07:07 Patient arrived in ED.                                                                  iw  

07:15 Assisted provider with intubation using 7.5 mm ETT via oral route. ET tube secured at   ph  

      28cm at the teeth. Set up intubation tray. Intubated by Afshin Mcdaniel MD Placement        

      verified by auscultating bilateral breath sounds, Patient tolerated well.                   

07:18 Afshin Mcdaniel MD is Attending Physician.                                            rt  

07:23 Triage completed.                                                                       iw  

07:23 Lucy Delarosa, RN is Primary Nurse.                                                   iw  

07:25 Inserted saline lock: 22 gauge in left hand, using aseptic technique. Maintain EMS IV.  ph  

      Dressing intact. Good blood return noted. Site clean \T\ dry. Gauge \T\ site: 20 LAC.       

07:40 Mohan cath inserted, using sterile technique, 16 Fr., by ED Tech, balloon inflated, to  ph  

      gravity drainage, urine specimen collected.                                                 

07:45 NGT: inserted 14 Fr. via left nare. verified placement of air over stomach, Patient     ph  

      tolerated well.                                                                             

07:46 Arm band placed on Patient placed in an exam room, on a stretcher, on oxygen, on        ph  

      cardiac monitor, on pulse oximetry.                                                         

07:48 Patient has correct armband on for positive identification. Placed in gown. Bed in low  ph  

      position. Side rails up X2. Seizure precautions initiated. Client placed on continuous      

      cardiac and pulse oximetry monitoring. NIBP monitoring applied.                             

08:03 Chest Single View XRAY In Process Unspecified.                                          EDMS

08:22 CT Head Brain wo Cont In Process Unspecified.                                           EDMS

09:41 initiated transfer to [facility].                                              bd  

11:42 Patient transferred, IV remains in place.                                               mb9 

                                                                                                  

Administered Medications:                                                                         

07:11 Drug: Propofol 75 mg Route: IVP; Site: left antecubital;                                iw  

07:20 Follow up: Response: No adverse reaction                                                jl7 

07:11 Drug: Rocuronium 75 mg Route: IVP; Site: left antecubital;                              iw  

07:45 Follow up: Response: No adverse reaction                                                jl7 

07:24 Drug: Keppra (levETIRAcetam) 2000 mg Route: IV; Rate: bolus; Site: left antecubital;    iw  

08:00 Follow up: Response: No adverse reaction; IV Status: Completed infusion                 jl7 

07:39 Drug: Propofol 5 mcg/kg/min Route: IV; Rate: calculated rate; Site: left antecubital;   iw  

07:50 Follow up: Rate change 20 mcg/kg/min                                                    iw  

07:55 Follow up: Response: RASS: Restless (+1); Rate change 25 mcg/kg/min                     jl7 

08:00 Follow up: Response: RASS: Light sedation (-2)                                          jl7 

08:30 Follow up: Response: RASS: Restless (+1); Rate change 30 mcg/kg/min                     jl7 

08:40 Follow up: Response: RASS: Light sedation (-2)                                          jl7 

09:30 Follow up: Response: RASS: Restless (+1); Rate change 35 mcg/kg/min                     jl7 

09:30 Follow up: Response: RASS: Restless (+1)                                                jl7 

10:30 Follow up: Response: RASS: Restless (+1); Rate change 40 mcg/kg/min                     jl7 

11:05 Follow up: Response: RASS: Light sedation (-2)                                          7 

07:50 Drug: Lactated Ringers Solution 1000 ml Route: IV; Rate: bolus; Site: left hand;        iw  

09:00 Follow up: Response: No adverse reaction; IV Status: Completed infusion; IV Intake:     jl7 

      1000ml                                                                                      

09:38 Drug: fentaNYL (PF) 50 mcg Route: IVP; Site: left hand;                                 5 

10:00 Follow up: Response: No adverse reaction                                                jl7 

10:28 Drug: Potassium Chloride 40 mEq Route: IV; Rate: calculated rate; Site: left            ph  

      antecubital;                                                                                

12:30 Follow up: IV Status: Infusion continued upon transfer                                  iw  

10:50 Not Given (Physician Discretion): Potassium Chloride Liquid 40 mEq PO once              jl7 

10:50 Drug: Calcium Gluconate 1 grams Route: IVPB; Infused Over: 60 mins; Site: left hand;    jl7 

12:01 Follow up: Response: No adverse reaction; IV Status: Completed infusion                 mb9 

10:55 Not Given (Physician Discretion): Insulin Drip - (Insulin Regular Human 100 units, NS   ph  

      0.9% 100 ml) IV at calculated rate continuous; Standard concentration 1unit/ml; Dose        

      for DKA is 0.1 units/kg/hr                                                                  

11:55 Drug: fentaNYL (PF) 50 mcg Route: IVP; Site: left hand;                                 mb9 

12:00 Follow up: Response: No adverse reaction                                                mb9 

11:58 Drug: Zofran (Ondansetron) 4 mg Route: IVP; Site: left hand;                            mb9 

12:01 Follow up: Response: No adverse reaction                                                mb9 

                                                                                                  

                                                                                                  

Medication:                                                                                       

07:47 VIS not applicable for this client.                                                     ph  

                                                                                                  

Intake:                                                                                           

09:00 IV: 1000ml; Total: 1000ml.                                                              jl7 

                                                                                                  

Output:                                                                                           

12:02 Urine: 1000ml (Mohan); Total: 1000ml.                                                   mb9 

12:02 Gastric: 150ml (Emesis); Total: 1150ml.                                                 mb9 

                                                                                                  

Outcome:                                                                                          

09:50 ER care complete, transfer ordered by MD.                                               rt  

11:41 Transferred                                                                             mb9 

11:41 Condition: stable                                                                           

11:42 Transferred by ground EMS to [facility], Transfer form completed.    mb9 

      X-rays sent w/ patient.                                                                     

11:42 Instructed on the need for transfer.                                                        

11:42 Patient left the ED.                                                                    mb9 

12:35 Patient left the ED.                                                                    mb9 

                                                                                                  

Signatures:                                                                                       

Dispatcher [employer]                           EDTerra Ruby Irene, RN                     Neda Duron RN                      Thomas Garrett ph, RN                        RN   Mary Torres RN RN [id number] Breneman, Mary Beth, RN                 RN   mb9                                                  

Afshin Mcdaniel MD MD   rt                                                   

                                                                                                  

**************************************************************************************************

## 2023-01-25 NOTE — XMS REPORT
Continuity of Care Document

                           Created on:2023



Patient:CLARISSA ANAND

Sex:Female

:2000

External Reference #:[id number]





Demographics







                          Address                   [address] [id number]

 

                          Home Phone                [phone]

 

                          Work Phone                [phone]

 

                          Mobile Phone              [phone]

 

                          Email Address             [email or web address]

 

                          Preferred Language        English

 

                          Marital Status            Unknown

 

                          Adventist Affiliation     Unknown

 

                          Race                      Unknown

 

                          Additional Race(s)        Unavailable



                                                    White

 

                          Ethnic Group              Not  or 









Author







                          Organization              [facility]

t

 

                          Address                   [address]



                                                    [place] [address]

 

                          Phone                     [phone]









Support







                Name            Relationship    Address         Phone

 

                KWABENA ANAND Mother          [phone] [place] [address] 

 

                KWABENA ANAND Unavailable     [address] [place], [place] [id number] [place] [id number] 

 

                Clarissa Anand Unavailable     [address] [place] [address] [place], [place] [id number] 

 

                Kwabena Anand Unavailable     [address] [place] [phone]



                                                [place] [address] 

 

                UNK             M               Unavailable     Unavailable









Care Team Providers







                    Name                Role                Phone

 

                    Pcp, Patient Does Not Have A Primary Care Physician [phone]



 

                    Rosina Carranza      Attending Clinician Unavailable

 

                    Deb Molina    Attending Clinician Unavailable

 

                    CRISTIANE PORTILLO     Attending Clinician Unavailable

 

                    Doctor Unassigned, No Name Attending Clinician Unavailable

 

                    PARVEZ STAPLES         Attending Clinician Unavailable

 

                    Nurse, Nel Pedalyssa     Attending Clinician Unavailable

 

                    Sheyla Paiz PA-C Attending Clinician [phone]

 

                    Claudia Brody RN     Attending Clinician Unavailable

 

                    Only, Ang Db Test   Attending Clinician Unavailable

 

                    Jonna Plaza MD     Attending Clinician [phone]

 

                    JONNA PLAZA        Attending Clinician Unavailable

 

                    Lab, Adc Fam Pob I  Attending Clinician Unavailable

 

                    Aziza Bettencourt  Attending Clinician [phone]

 

                    2, Adc Lab          Attending Clinician Unavailable

 

                    Parvez Staples MD      Attending Clinician [phone]

 

                    Physician, No Primary Care Admitting Clinician Unavailable









Payers







           Payer Name Policy Type Policy Number Effective Date Expiration Date S

[facility]

 

           Blue [employer] Blue 6          [id number]                       [facility]

 

           [employer] BCBS BLUE            [id number] 2021            



           ADVANTAGE HMO                       00:00:00              

 

           Blue Cross Blue 6          [id number]                       [facility]                [id number]  [id number]            



           HEALTHCARE PPO                       00:00:00              







Problems







       Condition Condition Condition Status Onset  Resolution Last   Treating Co

mments 

Source



       Name   Details Category        Date   Date   Treatment Clinician        



                                                 Date                 

 

       Overactive Overactive Disease Active 2019                             U

nivers



       bladder bladder                                              ity of



                                   00:00:                             [place]



                                   [address] [place] [address]

 

       [id number] Well woman Problem                                           Co

mmon



              exam with                                                  [facility]



              routine                                                  - [facility]



              gynecologi                                                  [facility]

 

       [id number] Mild major Problem                                           Com

mon



              depression                                                  [facility]

 

       [id number] Encounter Problem                                           Com

mon



              for                                                     [facility]



              initial                                                  - [facility]



              prescripti                                                  St



              on of                                                   [facility]



              injectable                                                  [facility]

[facility]



              nba                                                     

 

       [id number] Marijuana Problem                                           Comm

on



              abuse                                                   [facility]







Allergies, Adverse Reactions, Alerts







       Allergy Allergy Status Severity Reaction(s) Onset  Inactive Treating Comm

ents 

Source



       Name   Type                        Date   Date   Clinician        

 

       PENICILL DRUG   Active        Hives                        Univers



       IN     INGREDI                      1-25                        ity of



                                          00:00:                      [address]

 

       Penicill Propensi Active        Hives                        Univer

s



       in     ty to                       1-25                        ity of



              adverse                      00:00:                      [place]



              reaction                      [address] [place] [address]

 

       NO KNOWN Drug   Active                                           [facility]



       ALLERGIE Class                                                   ity of



       S                                                              [facility]







Family History







           Family Member Diagnosis  Comments   Start Date Stop Date  Source

 

           Natural father Diabetes                                    [facility]

 

           Natural mother                                             [facility]







Social History







           Social Habit Start Date Stop Date  Quantity   Comments   Source

 

           History of                       Current Smoker            [facility]

rit -



           Tobacco Use                                             [facility]

 

           Sex Assigned At                                             [facility]

porsha -



           Birth                                                  [facility]

 

           Exposure to 2022 Not sure              [facility]



           SARS-CoV-2 00:00:00   08:57:00                         [facility]



           (event)                                                [place]

 

           Alcohol intake 2022 Ex-drinker            [facility]



                      00:00:00   00:00:00   (finding)             [facility]

 

           Tobacco use and 2022-10-06 2022-10-06 Smokeless tobacco            Un

iversity of



           exposure   00:00:00   00:00:00   non-user              [facility]









                Smoking Status  Start Date      Stop Date       Source

 

                Unknown if ever smoked                                 [facility]

y of [facility]

 

                Smoker          2022-10-06 00:00:00                 University o

f [facility]

 

                Current Smoker  2022 00:00:00                 [facility]

t - [facility]

nter

 

                Former Smoker   2022 00:00:00 2022 00:00:00 Common S

pirit - [facility]

nter







Medications







       Ordered Filled Start  Stop   Current Ordering Indication Dosage Frequency

 Signature

                    Comments            Components          Source



     Medication Medication Date Date Medication? Clinician                (SIG) 

          



     Name Name                                                   

 

     SERTraline      2022      Yes       [id number] 100mg      Take 1           

Univers



     100 mg      2-28                               tablet by           ity of



     tablet      00:00:                               mouth at           [place]



               00                                 bedtime.           Medical



                                                                 Branch

 

     SERTraline      2022      Yes       [id number] 100mg      Take 1           

Univers



     100 mg      2-28                               tablet by           ity of



     tablet      00:00:                               mouth at           [place]



               00                                 bedtime.           Medical



                                                                 Branch

 

     mirabegron      2022- No                       Myrbetriq           U

nivers



     (MYRBETRIQ)      -                          50 mg           ity of



     50 mg      10:31: 00:00                          tablet,ext           [place]



     tablet      29   :00                           ended           Medical



                                                  release           Branch



                                                  Take 1           



                                                  tablet           



                                                  every day           



                                                  by oral           



                                                  route as           



                                                  directed           



                                                  for 30           



                                                  days.           

 

     mirabegron      2022- No                       Myrbetriq           U

nivers



     (MYRBETRIQ)      -                          50 mg           ity of



     50 mg      10:31: 00:00                          tablet,ext           [place]



     tablet      29   :00                           ended           Medical



                                                  release           Branch



                                                  Take 1           



                                                  tablet           



                                                  every day           



                                                  by oral           



                                                  route as           



                                                  directed           



                                                  for 30           



                                                  days.           

 

     mirabegron      2022- No                       Myrbetriq           U

nivers



     (MYRBETRIQ)      -17                          25 mg           ity of



     25 mg      10:31: 00:00                          tablet,ext           [place]



     tablet      23   :00                           ended           Medical



                                                  release           Branch



                                                  Take 1           



                                                  tablet           



                                                  every day           



                                                  by oral           



                                                  route as           



                                                  directed           



                                                  for 90           



                                                  days.           

 

     mirabegron      2022- No                       Myrbetriq           U

nivers



     (MYRBETRIQ)      -17                          25 mg           ity of



     25 mg      10:31: 00:00                          tablet,ext           [place]



     tablet      23   :00                           ended           Medical



                                                  release           Branch



                                                  Take 1           



                                                  tablet           



                                                  every day           



                                                  by oral           



                                                  route as           



                                                  directed           



                                                  for 90           



                                                  days.           

 

     levETIRAcet      2022      Yes            500mg      Take 500           U

nivers



     am 500 mg      1-17                               mg by           ity of



     tablet      10:30:                               mouth in           [place]



               40                                 the            Medical



                                                  morning           Branch



                                                  and 500 mg           



                                                  in the           



                                                  evening.           

 

     levETIRAcet      2022      Yes            500mg      Take 500           U

nivers



     am 500 mg      1-17                               mg by           ity of



     tablet      10:30:                               mouth in           [place]



               40                                 the            Medical



                                                  morning           Branch



                                                  and 500 mg           



                                                  in the           



                                                  evening.           

 

     levETIRAcet      2022      Yes            500mg      Take 500           U

nivers



     am 500 mg      1-17                               mg by           ity of



     tablet      10:30:                               mouth in           [place]



               40                                 the            Medical



                                                  morning           Branch



                                                  and 500 mg           



                                                  in the           



                                                  evening.           

 

     levETIRAcet      2022      Yes            500mg      Take 500           U

nivers



     am 500 mg      1-17                               mg by           ity of



     tablet      10:30:                               mouth in           [place]



               40                                 the            Medical



                                                  morning           Branch



                                                  and 500 mg           



                                                  in the           



                                                  evening.           

 

     SERTraline      2022      Yes       [id number] 100mg      Take 1           

Univers



     100 mg      1-17                               tablet by           ity of



     tablet      00:00:                               mouth at           [place]



               00                                 bedtime.           Medical



                                                                 Branch

 

     SERTraline      2022      Yes       [id number] 100mg      Take 1           

[facility]



     100 mg      1-17                               tablet by           ity of



     tablet      00:00:                               mouth at           [place]



               00                                 bedtime.           Medical



                                                                 Branch

 

     SERTraline      2022- No        [id number] 100mg      Take 1          

 [facility]



     100 mg      1-17 12-28                          tablet by           ity of



     tablet      00:00: 00:00                          mouth at           [place]



               00   :00                           bedtime.           Medical



                                                                 Branch

 

     SERTraline      2022- No        [id number] 100mg      Take 1          

 Univers



     100 mg      1-17 12-28                          tablet by           ity of



     tablet      00:00: 00:00                          mouth at           [place]



               00   :00                           bedtime.           Medical



                                                                 Branch

 

     escitalopra      2022- No             10mg      Take 10 mg          

 Univers



     m oxalate      0-06 10-06                          by mouth           ity o

f



     (LEXAPRO)      11:21: 00:00                          in the           [place]



     10 mg      03   :00                           morning.           Medical



     tablet                                                        Branch

 

     escitalopra      2022- No             10mg      Take 10 mg          

 Univers



     m oxalate      0-06 10-06                          by mouth           ity o

f



     (LEXAPRO)      11:21: 00:00                          in the           [place]



     10 mg      03   :00                           morning.           Medical



     tablet                                                        Branch

 

     mv,Ca,min/i      2022      Yes                      Take by           Uni

vers



     juan/FA/guar      0-06                               mouth.           ity of



     shea/caff      10:17:                                              [place]



     (ONE-A-DAY      20                                                Medical



     WOMEN'S                                                        Branch



     ACTIVE                                                        



     ORAL)                                                        

 

     mirabegron      2022      Yes                      Myrbetriq           Un

kobi



     (MYRBETRIQ)      0-06                               25 mg           ity of



     25 mg      10:17:                               tablet,ext           [place]



     tablet      20                                 ended           Medical



                                                  release           Branch



                                                  Take 1           



                                                  tablet           



                                                  every day           



                                                  by oral           



                                                  route as           



                                                  directed           



                                                  for 90           



                                                  days.           

 

     mirabegron      2022      Yes                      Myrbetriq           Un

kobi



     (MYRBETRIQ)      0-06                               50 mg           ity of



     50 mg      10:17:                               tablet,ext           [place]



     tablet      20                                 ended           Medical



                                                  release           Branch



                                                  Take 1           



                                                  tablet           



                                                  every day           



                                                  by oral           



                                                  route as           



                                                  directed           



                                                  for 30           



                                                  days.           

 

     pantoprazol      2022      Yes            40mg      Take 40 mg           

Univers



     e         0-06                               by mouth           ity of



     (PROTONIX)      10:17:                               in the           [place]



     40 mg EC      20                                 morning.           Medical



     tablet                                                        Branch

 

     levETIRAcet      2022      Yes            500mg      Take 500           U

nivers



     am (KEPPRA)      0-06                               mg by           ity of



     500 mg      10:17:                               mouth in           [place]



     tablet      20                                 the            Medical



                                                  morning           Branch



                                                  and 500 mg           



                                                  in the           



                                                  evening.           

 

     mv,Ca,min/i      2022      Yes                      Take by           Uni

vers



     juan/FA/guar      0-06                               mouth.           ity of



     shea/caff      10:17:                                              [place]



     (ONE-A-DAY      20                                                Medical



     WOMEN'S                                                        Branch



     ACTIVE                                                        



     ORAL)                                                        

 

     mirabegron      2022      Yes                      Myrbetriq           Un

kobi



     (MYRBETRIQ)      0-06                               25 mg           ity of



     25 mg      10:17:                               tablet,ext           [place]



     tablet      20                                 ended           Medical



                                                  release           Branch



                                                  Take 1           



                                                  tablet           



                                                  every day           



                                                  by oral           



                                                  route as           



                                                  directed           



                                                  for 90           



                                                  days.           

 

     mirabegron      2022      Yes                      Myrbetriq           [facility]

k[facility]



     (MYRBETRIQ)      0-06                               50 mg           [facility] of



     50 mg      10:17:                               tablet,ext           [place]



     tablet      20                                 ended           Medical



                                                  release           [facility]



                                                  Take 1           



                                                  tablet           



                                                  every day           



                                                  by oral           



                                                  route as           



                                                  directed           



                                                  for 30           



                                                  days.           

 

     pantoprazol      2022      Yes            40mg      Take 40 mg           

[facility]



     e         0-06                               by mouth           [facility]



     (PROTONIX)      10:17:                               in the           [place]



     40 mg EC      20                                 morning.           Medical



     tablet                                                        [facility]

 

     levETIRAcet      2022      Yes            500mg      Take 500           [facility]

nivers



     am (KEPPRA)      0-06                               mg by           [facility]



     500 mg      10:17:                               mouth in           [place]



     tablet      20                                 the            Medical



                                                  morning           [facility]



                                                  and 500 mg           



                                                  in the           



                                                  evening.           

 

     mv,Ca,min/i      2022      Yes                      Take by           [facility]

vers



     juan/FA/guar      0-06                               mouth.           [facility] of



     shea/caff      10:17:                                              [place]



     (ONE-A-DAY      20                                                Medical



     WOMEN'S                                                        [place]



     ACTIVE                                                        



     ORAL)                                                        

 

     pantoprazol      2022      Yes            40mg      Take 40 mg           

[facility]



     e         0-06                               by mouth           [facility]



     (PROTONIX)      10:17:                               in the           [place]



     40 mg EC      20                                 morning.           Medical



     tablet                                                        [facility]

 

     mv,Ca,min/i      2022      Yes                      Take by           [facility]

vers



     juan/FA/guar      0-06                               mouth.           ity of



     shea/caff      10:17:                                              [place]



     (ONE-A-DAY      20                                                Medical



     WOMEN'S                                                        [place]



     ACTIVE                                                        



     ORAL)                                                        

 

     pantoprazol      2022      Yes            40mg      Take 40 mg           

[facility]



     e         0-06                               by mouth           [facility]



     (PROTONIX)      10:17:                               in the           [place]



     40 mg EC      20                                 morning.           Medical



     tablet                                                        [facility]

 

     mv,Ca,min/i      2022      Yes                      Take by           [facility]

vers



     juan/FA/guar      0-06                               mouth.           ity of



     shea/caff      10:17:                                              [place]



     (ONE-A-DAY      20                                                Medical



     WOMEN'S                                                        [place]



     ACTIVE                                                        



     ORAL)                                                        

 

     pantoprazol      2022      Yes            40mg      Take 40 mg           

[facility]



     e         0-06                               by mouth           [facility]



     (PROTONIX)      10:17:                               in the           [place]



     40 mg EC      20                                 morning.           Medical



     tablet                                                        [facility]

 

     mv,Ca,min/i      2022      Yes                      Take by           [facility]

vers



     juan/FA/guar      0-06                               mouth.           ity of



     shea/caff      10:17:                                              [place]



     (ONE-A-DAY      20                                                Medical



     WOMEN'S                                                        [place]



     ACTIVE                                                        



     ORAL)                                                        

 

     pantoprazol      2022      Yes            40mg      Take 40 mg           

[facility]



     e         0-06                               by mouth           [facility]



     (PROTONIX)      10:17:                               in the           [place]



     40 mg EC      20                                 morning.           Medical



     tablet                                                        [facility]

 

     SERTraline      2022      Yes       [id number] 50mg      Take 1           [facility]

nivers



     50 mg      0-06                               tablet by           [facility]



     tablet      00:00:                               mouth in           [place]



               00                                 the            Medical



                                                  morning.           [facility]



                                                  Take 1/2           



                                                  tab by           



                                                  mouth           



                                                  daily for           



                                                  the first           



                                                  week           



                                                  before           



                                                  transition           



                                                  ing to           



                                                  full           



                                                  dosage.           

 

     SERTraline      2022      Yes       [id number] 50mg      Take 1           U

nivers



     50 mg      0-06                               tablet by           [facility]



     tablet      00:00:                               mouth in           [place]



               00                                 the            Medical



                                                  morning.           [facility]



                                                  Take 1/2           



                                                  tab by           



                                                  mouth           



                                                  daily for           



                                                  the first           



                                                  week           



                                                  before           



                                                  transition           



                                                  ing to           



                                                  full           



                                                  dosage.           

 

     SERTraline      2022- No        [id number] 50mg      Take 1           

[facility]



     50 mg      0--17                          tablet by           [facility]



     tablet      00:00: 00:00                          mouth in           [place]



               00   :00                           the            Medical



                                                  morning.           [facility]



                                                  Take 1/2           



                                                  tab by           



                                                  mouth           



                                                  daily for           



                                                  the first           



                                                  week           



                                                  before           



                                                  transition           



                                                  ing to           



                                                  full           



                                                  dosage.           

 

     SERTraline      2022- No        [id number] 50mg      Take 1           

[facility]



     50 mg      0--17                          tablet by           [facility]



     tablet      00:00: 00:00                          mouth in           [place]



               00   :00                           the            Medical



                                                  morning.           [facility]



                                                  Take 1/2           



                                                  tab by           



                                                  mouth           



                                                  daily for           



                                                  the first           



                                                  week           



                                                  before           



                                                  transition           



                                                  ing to           



                                                  full           



                                                  dosage.           

 

     Escitalopra Escitalopra       No             1{table QD   Escitalopr 

          



     m Oxalate m Oxalate 4-25                     t}        am Oxalate          

 



     10 MG 10 MG 00:00:                               10 MG           



               00                                                

 

     Escitalopra Escitalopra       No             1{table QD   [facility]r 

          



     m Oxalate m Oxalate 4-25                     t}        am Oxalate          

 



     10 MG 10 MG 00:00:                               10 MG           



               00                                                

 

     Escitalopra Escitalopra 0      No             1{table QD   [facility] 

          



     m Oxalate 5 m Oxalate 5 4-01                     t}        am Oxalate      

     



     MG   MG   00:00:                               5 MG           



               00                                                

 

     mirabegron      -0      Yes                      Myrbetriq           [facility]

[facility]



     (MYRBETRIQ)      1-18                               25 mg           [facility]



     25 mg      11:07:                               tablet,ext           [place]



     tablet      08                                 ended           Medical



                                                  release           [facility]



                                                  Take 1           



                                                  tablet           



                                                  every day           



                                                  by oral           



                                                  route as           



                                                  directed           



                                                  for 90           



                                                  days.           

 

     mirabegron      -0      Yes                      Myrbetriq           [facility]

[facility]



     (MYRBETRIQ)      1-18                               50 mg           [facility]



     50 mg      11:07:                               tablet,ext           [place]



     tablet      08                                 ended           Medical



                                                  release           [facility]



                                                  Take 1           



                                                  tablet           



                                                  every day           



                                                  by oral           



                                                  route as           



                                                  directed           



                                                  for 30           



                                                  days.           

 

     mirabegron      -0      Yes                      Myrbetriq           [facility]

[facility]



     (MYRBETRIQ)      1-18                               25 mg           [facility]



     25 mg      11:07:                               tablet,ext           [place]



     tablet      08                                 ended           Medical



                                                  release           [facility]



                                                  Take 1           



                                                  tablet           



                                                  every day           



                                                  by oral           



                                                  route as           



                                                  directed           



                                                  for 90           



                                                  days.           

 

     mirabegron      -0      Yes                      Myrbetriq           [facility]

[facility]



     (MYRBETRIQ)      1-18                               50 mg           ity [facility]



     50 mg      11:07:                               tablet,ext           [place]



     tablet      08                                 ended           Medical



                                                  release           [facility]



                                                  Take 1           



                                                  tablet           



                                                  every day           



                                                  by oral           



                                                  route as           



                                                  directed           



                                                  for 30           



                                                  days.           

 

     medroxyPROG      2021-0      Yes       [id number] 150mg                     

[facility]



     ESTERone      6-07                                              ity of



     (DEPO-PROVE      15:15:                                              [place]



     RA)       00                                                Medical



     injection                                                        [facility]



     150 mg                                                        

 

     medroxyPROG      2021-0      Yes       [id number] 150mg                     

[facility]



     ESTERone      6-07                                              ity of



     (DEPO-PROVE      15:15:                                              [place]



     RA)       00                                                Medical



     injection                                                        [facility]



     150 mg                                                        

 

     medroxyPROG      2021-0      Yes       [id number] 150mg                     

[facility]



     ESTERone      6-07                                              ity of



     (DEPO-PROVE      15:15:                                              [place]



     RA)       00                                                Medical



     injection                                                        [facility]



     150 mg                                                        

 

     medroxyPROG      2021-0      Yes       [id number] 150mg                     

[facility]



     ESTERone      6-07                                              ity of



     (DEPO-PROVE      15:15:                                              [place]



     RA)       00                                                Medical



     injection                                                        [facility]



     150 mg                                                        

 

     medroxyPROG      2021-0      Yes       [id number] 150mg                     

[facility]



     ESTERone      6-07                                              ity of



     (DEPO-PROVE      15:15:                                              [place]



     RA)       00                                                Medical



     injection                                                        [facility]



     150 mg                                                        

 

     medroxyPROG      2021-0      Yes       [id number] 150mg                     

[facility]



     ESTERone      6-07                                              ity of



     (DEPO-PROVE      15:15:                                              [place]



     RA)       00                                                Medical



     injection                                                        [facility]



     150 mg                                                        

 

     medroxyPROG      2021-0      Yes       [id number] 150mg                     

[facility]



     ESTERone      6-07                                              ity of



     (DEPO-PROVE      15:15:                                              [place]



     RA)       00                                                Medical



     injection                                                        [facility]



     150 mg                                                        

 

     medroxyPROG      2021-0      Yes       [id number] 150mg                     

[facility]



     ESTERone      6-07                                              ity of



     (DEPO-PROVE      15:15:                                              [place]



     RA)       00                                                Medical



     injection                                                        [facility]



     150 mg                                                        

 

     medroxyPROG      2021-0      Yes       [id number] 150mg                     

[facility]



     ESTERone      6-07                                              ity of



     (DEPO-PROVE      15:15:                                              [place]



     RA)       00                                                Medical



     injection                                                        [facility]



     150 mg                                                        

 

     medroxyPROG      2021-0      Yes       [id number] 150mg                     

[facility]



     ESTERone      6-07                                              ity of



     (DEPO-PROVE      15:15:                                              [place]



     RA)       00                                                Medical



     injection                                                        [facility]



     150 mg                                                        

 

     medroxyPROG      2021-0      Yes       [id number] 150mg                     

[facility]



     ESTERone      6-07                                              ity of



     (DEPO-PROVE      15:15:                                              [place]



     RA)       00                                                Medical



     injection                                                        Branch



     150 mg                                                        

 

     medroxyPROG      2021-0      Yes       [id number] 150mg                     

[facility]



     ESTERone      6-07                                              ity of



     (DEPO-PROVE      15:15:                                              [place]



     RA)       00                                                Medical



     injection                                                        Branch



     150 mg                                                        

 

     medroxyPROG      -0      Yes       [id number] 150mg                     

[facility]



     ESTERone      6-07                                              ity of



     (DEPO-PROVE      15:15:                                              [place]



     RA)       00                                                Medical



     injection                                                        Branch



     150 mg                                                        

 

     medroxyPROG      0 - No             150mg                     [facility]

ers



     ESTERone                                               ity of



     (DEPO-PROVE      19:45: 19:44                                         [place]



     RA)       00   :00                                          Medical



     injection                                                        [facility]



     150 mg                                                        

 

     medroxyPROG      -0 - No             150mg                     [facility]

ers



     ESTERone                                               ity of



     (DEPO-PROVE      19:45: 19:44                                         [place]



     RA)       00   :00                                          Medical



     injection                                                        [facility]



     150 mg                                                        

 

     medroxyPROG      -0 - No             150mg                     [facility]

ers



     ESTERone                                               ity of



     (DEPO-PROVE      19:45: 19:44                                         [place]



     RA)       00   :00                                          Medical



     injection                                                        [facility]



     150 mg                                                        

 

     medroxyPROG      0 - No             150mg                     [facility]

ers



     ESTERone                                               ity of



     (DEPO-PROVE      19:45: 19:44                                         [place]



     RA)       00   :00                                          Medical



     injection                                                        [facility]



     150 mg                                                        

 

     medroxyPROG      0 - No             150mg                     [facility]

ers



     ESTERone                                               ity of



     (DEPO-PROVE      19:45: 19:44                                         [place]



     RA)       00   :00                                          Medical



     injection                                                        [facility]



     150 mg                                                        

 

     medroxyPROG      0 - No             150mg      150 mg,           U

nivers



     ESTERone                                Intramuscu           ity 

of



     (DEPO-PROVE      19:45: 19:44                          lar,           [place]



     RA)       00   :00                           [id number],           Medical



     injection                                         4 doses,           Branch



     150 mg                                         First dose           



                                                  on 21 at           



                                                  1345, Last           



                                                  dose on           



                                                  Fri            



                                                  10/8/21 at           



                                                  1345,           



                                                  Routine           

 

     mv,Ca,min/i            Yes                      Take by           Uni

vers



     juan/FA/guar      1-25                               mouth.           ity of



     shea/caff      21:18:                                              [place]



     (ONE-A-DAY      11                                                Medical



     [facility]                                                        [facility]



     ACTIVE                                                        



     ORAL)                                                        

 

     mv,Ca,min/i      0      Yes                      Take by           Uni

vers



     juan/FA/guar      1-25                               mouth.           ity of



     shea/caff      21:18:                                              [place]



     (ONE-A-DAY      11                                                Medical



     [facility]                                                        Branch



     ACTIVE                                                        



     ORAL)                                                        

 

     mv,Ca,min/i      -0      Yes                      Take by           Uni

vers



     juan/FA/guar      1-25                               mouth.           ity of



     shea/caff      15:18:                                              [place]



     (ONE-A-DAY      11                                                Medical



     [facility]                                                        Branch



     ACTIVE                                                        



     ORAL)                                                        

 

     mv,Ca,min/i      -0      Yes                      Take by           Uni

vers



     juan/FA/guar      1-25                               mouth.           ity of



     shea/caff      15:18:                                              [place]



     (ONE-A-DAY      11                                                Medical



     [facility]                                                        Branch



     ACTIVE                                                        



     ORAL)                                                        

 

     mv,Ca,min/i      -0      Yes                      Take by           Uni

vers



     juan/FA/guar      1-25                               mouth.           ity of



     shea/caff      15:18:                                              [place]



     (ONE-A-DAY      11                                                Medical



     [facility]                                                        Branch



     ACTIVE                                                        



     ORAL)                                                        

 

     mv,Ca,min/i      -0      Yes                      Take by           Uni

vers



     juan/FA/guar      1-25                               mouth.           ity of



     shea/caff      15:18:                                              [place]



     (ONE-A-DAY      11                                                Medical



     [facility]                                                        Branch



     ACTIVE                                                        



     ORAL)                                                        

 

     mv,Ca,min/i      -0      Yes                      Take by           Uni

vers



     juan/FA/guar      1-25                               mouth.           ity of



     shea/caff      15:18:                                              [place]



     (ONE-A-DAY      11                                                Medical



     [facility]                                                        Branch



     ACTIVE                                                        



     ORAL)                                                        

 

     mv,Ca,min/i      -0      Yes                      Take by           Uni

vers



     juan/FA/guar      1-25                               mouth.           ity of



     shea/caff      15:18:                                              [place]



     (ONE-A-DAY      11                                                Medical



     [facility]                                                        Branch



     ACTIVE                                                        



     ORAL)                                                        

 

     medroxyprog medroxyprog -      No             150mg                   

  Common



     esterone ac esterone ac 0-01                                              S

pirit



               00:00:                                              [facility]



               [facility]                                                [facility]

 

     medroxyprog medroxyprog 2019      No             150mg                   

  Common



     esterone ac esterone ac 0-01                                              S

pirit



               00:00:                                              - [facility]



               [facility]                                                [facility]

 

     medroxyprog medroxyprog 2019      No             150mg                   

  Common



     esterone ac esterone ac 0-01                                              S

pirit



               00:00:                                              - [facility]



               [facility]                                                [facility]

 

     medroxyprog medroxyprog 2019      No             150mg                   

  Common



     esterone ac esterone ac 0-01                                              S

pirit



               00:00:                                              - [facility]



               [facility]                                                [facility]

 

     medroxyprog medroxyprog 2019-1      No             150mg                   

  Common



     esterone ac esterone ac 0-01                                              S

pirit



               00:00:                                              - [facility]



               [facility]                                                [facility]

 

     medroxyprog medroxyprog 2019-1      No             150mg                   

  Common



     esterone ac esterone ac 0-01                                              S

pirit



               00:00:                                              - [facility]



               [facility]                                                [facility]

 

     medroxyprog medroxyprog 2019-1      No             150mg                   

  Common



     esterone ac esterone ac 0-01                                              S

pirit



               00:00:                                              - [facility]



               [facility]                                                [facility]

 

     medroxyprog medroxyprog 2019-1      No             150mg                   

  Common



     esterone ac esterone ac 0-01                                              S

pirit



               00:00:                                              - [facility]



               [facility]                                                [facility]

 

     medroxyprog medroxyprog 2019-1      No             150mg                   

  Common



     esterone ac esterone ac 0-                                              S

pirit



               00:00:                                              - [facility]



               [facility]                                                [facility]

 

     medroxyprog medroxyprog 2019-0      No             150mg                   

  Common



     esterone ac esterone ac 7-08                                              S

pirit



               00:00:                                              - [facility]



               [facility]                                                [facility]

 

     medroxyprog medroxyprog 2019-0      No             150mg                   

  Common



     esterone ac esterone ac 7-08                                              S

pirit



               00:00:                                              - [facility]



               [facility]                                                [facility]

 

     medroxyprog medroxyprog 2019-0      No             150mg                   

  Common



     esterone ac esterone ac 7-08                                              S

pirit



               00:00:                                              - [facility]



               [facility]                                                [facility]

 

     medroxyprog medroxyprog 2019-0      No             150mg                   

  Common



     esterone ac esterone ac 7-08                                              S

pirit



               00:00:                                              - [facility]



               [facility]                                                [facility]

 

     medroxyprog medroxyprog 2019-0      No             150mg                   

  Common



     esterone ac esterone ac 7-08                                              S

pirit



               00:00:                                              - [facility]



               [facility]                                                [facility]

 

     medroxyprog medroxyprog 2019-0      No             150mg                   

  Common



     esterone ac esterone ac 7-08                                              S

pirit



               00:00:                                              - [facility]



               [facility]                                                [facility]

 

     medroxyprog medroxyprog 2019-0      No             150mg                   

  Common



     esterone ac esterone ac 7-08                                              S

pirit



               00:00:                                              - [facility]



               [facility]                                                [facility]

 

     medroxyprog medroxyprog 2019-0      No             150mg                   

  Common



     esterone ac esterone ac 7-08                                              S

pirit



               00:00:                                              - [facility]



               [facility]                                                [facility]

 

     medroxyprog medroxyprog 2019-0      No             150mg                   

  Common



     esterone ac esterone ac 7-08                                              S

pirit



               00:00:                                              - [facility]



               [facility]                                                [facility]

 

     medroxyprog medroxyprog 2019-0      No             150mg                   

  Common



     esterone ac esterone ac 4-08                                              S

pirit



               00:00:                                              - [facility]



               [facility]                                                [facility]

 

     medroxyprog medroxyprog 2019-0      No             150mg                   

  Common



     esterone ac esterone ac 4-08                                              S

pirit



               00:00:                                              - [facility]



               [facility]                                                [facility]

 

     medroxyprog medroxyprog 2019-0      No             150mg                   

  Common



     esterone ac esterone ac 4-08                                              S

pirit



               00:00:                                              - [facility]



                                                               [facility]

 

     medroxyprog medroxyprog 2019-0      No             150mg                   

  Common



     esterone ac esterone ac 4-08                                              S

pirit



               00:00:                                              - [facility]



                                                               [facility]

 

     medroxyprog medroxyprog 2019-0      No             150mg                   

  Common



     esterone ac esterone ac 4-08                                              S

pirit



               00:00:                                              - [facility]



                                                               [facility]

 

     medroxyprog medroxyprog 2019-0      No             150mg                   

  Common



     esterone ac esterone ac 4-08                                              S

pirit



               00:00:                                              - [facility]



               00                                                [facility]

 

     medroxyprog medroxyprog 2019-0      No             150mg                   

  Common



     esterone ac esterone ac 4-08                                              S

pirit



               00:00:                                              - [facility]



               00                                                [facility]

 

     medroxyprog medroxyprog 2019-0      No             150mg                   

  Common



     esterone ac esterone ac 4-08                                              S

pirit



               00:00:                                              - [facility]



                                                               [facility]

 

     medroxyprog medroxyprog 2019-0      No             150mg                   

  Common



     esterone ac esterone ac 4-08                                              S

pirit



               00:00:                                              - [facility]



                                                               [facility]

 

     medroxyprog medroxyprog 2019-0      No             150mg                   

  Common



     esterone ac esterone ac 1-07                                              S

pirit



               00:00:                                              - [facility]



                                                               [facility]

 

     medroxyprog medroxyprog 2019-0      No             150mg                   

  Common



     esterone ac esterone ac 1-07                                              S

pirit



               00:00:                                              - [facility]



                                                               [facility]

 

     medroxyprog medroxyprog 2019-0      No             150mg                   

  Common



     esterone ac esterone ac 1-07                                              S

pirit



               00:00:                                              - [facility]



               [facility]                                                [facility]

 

     medroxyprog medroxyprog 2019-0      No             150mg                   

  Common



     esterone ac esterone ac 1-07                                              S

pirit



               00:00:                                              - [facility]



               00                                                [facility]

 

     medroxyprog medroxyprog 2019-0      No             150mg                   

  Common



     esterone ac esterone ac 1-                                              S

pirit



               00:00:                                              - [facility]



               [facility]                                                [facility]

 

     medroxyprog medroxyprog 2019-0      No             150mg                   

  Common



     esterone ac esterone ac -                                              S

pirit



               00:00:                                              - [facility]



               00                                                [facility]

 

     medroxyprog medroxyprog 2019-0      No             150mg                   

  Common



     esterone ac esterone ac -                                              S

pirit



               00:00:                                              - [facility]



               [facility]                                                [facility]

 

     medroxyprog medroxyprog 2019-0      No             150mg                   

  Common



     esterone ac esterone ac                                               S

pirit



               00:00:                                              - [facility]



               00                                                [facility]

 

     medroxyprog medroxyprog 2019-0      No             150mg                   

  Common



     esterone ac esterone ac -                                              S

pirit



               00:00:                                              - [facility]



               00                                                [facility]

 

     Escitalopra Escitalopra           No                       Escitalopr      

     



     m Oxalate m Oxalate                                    am Oxalate          

 



     10 MG 10 MG                                    10 MG           

 

     levETIRAcet levETIRAcet           No             1{table BID  levETIRAce   

        



     am 500 MG am 500 MG                          t}        narvaez 500 MG          

 

 

     Escitalopra Escitalopra           No                       Escitalopr      

     



     m Oxalate m Oxalate                                    am Oxalate          

 



     10 MG 10 MG                                    10 MG           

 

     levETIRAcet levETIRAcet           No             1{table BID  levETIRAce   

        



     am 500 MG am 500 MG                          t}        narvaez 500 MG          

 

 

     Pantoprazol Pantoprazol           No             1{table QD   Pantoprazo   

        



     e Sodium 40 e Sodium 40                          t}        le Sodium       

    



     MG   MG                                      40 MG           

 

     Escitalopra Escitalopra           No                       Escitalopr      

     



     m Oxalate m Oxalate                                    am Oxalate          

 



     10 MG 10 MG                                    10 MG           

 

     levETIRAcet levETIRAcet           No             1{table BID  levETIRAce   

        



     am 500 MG am 500 MG                          t}        narvaez 500 MG          

 

 

     Escitalopra Escitalopra           No                       Escitalopr      

     



     m Oxalate m Oxalate                                    am Oxalate          

 



     10 MG 10 MG                                    10 MG           

 

     Pantoprazol Pantoprazol           No             1{table QD   Pantoprazo   

        



     e Sodium 40 e Sodium 40                          t}        le Sodium       

    



     MG   MG                                      40 MG           

 

     levETIRAcet levETIRAcet           No             1{table BID  levETIRAce   

        



     am 500 MG am 500 MG                          t}        narvaez 500 MG          

 

 

     Escitalopra Escitalopra           No                       Escitalopr      

     



     m Oxalate m Oxalate                                    am Oxalate          

 



     10 MG 10 MG                                    10 MG           

 

     levETIRAcet levETIRAcet           No             1{table BID  levETIRAce   

        



     am 500 MG am 500 MG                          t}        narvaez 500 MG          

 

 

     Escitalopra Escitalopra           No                       Escitalopr      

     



     m Oxalate m Oxalate                                    am Oxalate          

 



     10 MG 10 MG                                    10 MG           







Vital Signs







             Vital Name   Observation Time Observation Value Comments     Source

 

             height       2022 08:40:00 66.5 [in_i]               [facility]

 

             weight       2022 08:40:00 108 [lb_av]               [facility]

 

             temperature  2022 08:40:00 97.8 [degF]               [facility]

 

             bmi          2022 08:40:00 17.17 kg/m2               [facility]

 

             oximetry     2022 08:40:00 97 %                      [facility]

 

             respiratory rate 2022 08:40:00 16 /min                   Comm

on [facility]

 

             blood pressure 2022 08:40:00 122 mm[Hg]                [facility] -



             systolic                                            [facility]

 

             blood pressure 2022 08:40:00 62 mm[Hg]                 [facility] -



             diastolic                                           [facility]

 

             height       2022 09:20:00 65 [in_i]                 [facility]

 

             weight       2022 09:20:00 115 [lb_av]               [facility]

 

             temperature  2022 09:20:00 98 [degF]                 [facility]

 

             bmi          2022 09:20:00 19.13 kg/m2               [facility]

 

             height       2022 08:20:00 65 [in_i]                 [facility]

 

             weight       2022 08:20:00 108.6 [lb_av]              Common 

[facility]

 

             temperature  2022 08:20:00 98.2 [degF]               Common S

[facility]

 

             bmi          2022 08:20:00 18.07 kg/m2               [facility]

 

             oximetry     2022 08:20:00 100 %                     Common [facility]

 

             respiratory rate 2022 08:20:00 16 /min                   Comm

on [facility]

 

             blood pressure 2022 08:20:00 122 mm[Hg]                Common

 \[facility]\"" -



             systolic                                            [facility]

 

             blood pressure 2022 08:20:00 64 mm[Hg]                 Common

 \[facility]\"" -



             diastolic                                           [facility]

 

             height       2022 08:20:00 65 [in_i]                 Common [facility]

 

             weight       2022 08:20:00 110 [lb_av]               [facility]

 

             temperature  2022 08:20:00 97.6 [degF]               [facility]

[facility]

 

             bmi          2022 08:20:00 18.3 kg/m2                [facility]

 

             oximetry     2022 08:20:00 100 %                     [facility]

 

             respiratory rate 2022 08:20:00 16 /min                   Comm

on [facility]

 

             blood pressure 2022 08:20:00 128 mm[Hg]                Common

 \[facility]\"" -



             systolic                                            [facility]

 

             blood pressure 2022 08:20:00 64 mm[Hg]                 Common

 \[facility]\"" -



             diastolic                                           [facility]

 

             height       2022 10:00:00 65 [in_i]                 Common [facility]

 

             weight       2022 10:00:00 109.6 [lb_av]              Common 

[facility]

 

             temperature  2022 10:00:00 97.9 [degF]               Common [facility]

 

             bmi          2022 10:00:00 18.24 kg/m2               Common S

pirit -



                                                                 [facility]

 

             oximetry     2022 10:00:00 99 %                      Common S

pirit -



                                                                 [facility]

 

             respiratory rate 2022 10:00:00 16 /min                   Comm

on Spirit -



                                                                 [facility]

 

             blood pressure 2022 10:00:00 139 mm[Hg]                Common

 Spirit -



             systolic                                            [facility]

 

             blood pressure 2022 10:00:00 82 mm[Hg]                 Common

 Spirit -



             diastolic                                           [facility]

 

             Systolic blood 2022 15:00:00 135 mm[Hg]                Univer

sity of



             pressure                                            [facility]

 

             Diastolic blood 2022 15:00:00 80 mm[Hg]                 Unive

rsity of



             [facility]

 

             Heart rate   2022 15:00:00 72 /min                   Universi

ty of



                                                                 [facility]



                                                                 Branch

 

             Respiratory rate 2022 15:00:00 20 /min                   Univ

ersity of



                                                                 [facility]



                                                                 Branch

 

             Body height  2022 15:00:00 167.6 cm                  Universi

ty of



                                                                 [facility]



                                                                 Branch

 

             Body weight  2022 15:00:00 49.896 kg                 Universi

ty of



                                                                 [facility]



                                                                 Branch

 

             BMI          2022 15:00:00 17.75 kg/m2               Universi

ty of



                                                                 [facility]



                                                                 Branch

 

             Respiratory rate 2022 16:28:00 18 /min                   Univ

ersity of



                                                                 [facility]



                                                                 Branch

 

             Body height  2022 16:28:00 167.6 cm                  Universi

ty of



                                                                 [facility]



                                                                 Branch

 

             Body weight  2022 16:28:00 51.483 kg                 Universi

ty of



                                                                 [facility]



                                                                 Branch

 

             BMI          2022 16:28:00 18.32 kg/m2               Universi

ty of



                                                                 [facility]



                                                                 Branch

 

             Systolic blood 2022-10-06 15:12:00 122 mm[Hg]                Univer

sity of



             pressure                                            [facility]



                                                                 Branch

 

             Diastolic blood 2022-10-06 15:12:00 83 mm[Hg]                 Unive

rsity of



             pressure                                            [facility]

 

             Heart rate   2022-10-06 15:12:00 61 /min                   Universi

ty of



                                                                 [facility]



                                                                 Branch

 

             Respiratory rate 2022-10-06 15:12:00 18 /min                   Univ

ersity of



                                                                 [facility]



                                                                 Branch

 

             Body height  2022-10-06 15:12:00 167.6 cm                  [facility]

 

             Body weight  2022-10-06 15:12:00 49.805 kg                 [facility]

 

             BMI          2022-10-06 15:12:00 17.72 kg/m2               [facility]

 

             Body temperature 2021 15:25:00 36.78 Comfort                 [facility]

 

             Oxygen saturation in 2021 21:16:00 98 /min                   

[facility]



             Arterial blood by                                        [place] [facility]

mercedez



             Pulse oximetry                                        Branch







Procedures







                Procedure       Date / Time     Performing Clinician Source



                                Performed                       

 

                ASSIGNMENT OF BENEFITS 2022-10-06 15:10:15 Doctor Unassigned, No

 [facility]



                                                Name            Medical Branch

 

                CONSENT/REFUSAL FOR 2022-10-06 14:58:14 Doctor Unassigned, No [facility]



                [facility]REAT[facility]                 Name            Medical 

Branch

 

                CONSENT/REFUSAL FOR 2022-10-06 14:58:14 Doctor Unassigned, No [facility]



                DIAGNOSIS AND TREATMENT                 Name            Medical 

Branch

 

                PSYCHIATRY CLINIC 2022-10-06 05:01:00 Doctor Unassigned, No [facility]



                PATIENT INFORMATION                 Name            Medical [facility]

ch

 

                AUTHORIZATION FOR 2022-10-06 05:01:00 Doctor Unassigned, No [facility]



                RELEASE OF PHI                  Name            Medical Branch

 

                REFERRAL-       2022 05:01:00 Doctor Unassigned, No [facility]



                REQUEST/RESPONSE                 Name            Medical Branch

 

                REFERRAL-       2022 05:01:00 Doctor Unassigned, No [facility]



                REQUEST/RESPONSE                 Name            Medical Branch

 

                PROLACTIN       2021 19:25:00 [facility]

 

                THYROID STIMULATING 2021 19:25:00 Matty Parvez     [facility]i

[facility] [place]



                HORMONE                                         [facility]

 

                BASIC METABOLIC PANEL 2021 19:25:00 Fish Parvez     Univer

[facility]ity [facility] [place]



                (NA, K, CL, CO2,                                 Medical Branch



                GLUCOSE, BUN,                                   



                CREATININE, CA)                                 

 

                LIPID PANEL     2021 19:25:00 [facility]



                ([id number])(TOTAL                                   Medical Branch



                CHOLESTEROL,                                    



                TRIGLYCERIDES, HDL)                                 

 

                ADC OR FELICITY ONLY - 2021 19:25:00 Fish Parvez     Univer

[facility] [place]



                RPR                                             Medical Branch

 

                HIV 2 AG-AB WITH 2021 19:25:00 Parvez [facility] Branch

 

                ASSIGNMENT OF BENEFITS 2021 20:55:16 Doctor Unassigned, No

 [facility]            Medical Branch







Encounters







        Start   End     Encounter Admission Attending Care    Care    Encounter 

Source



        Date/Time Date/Time Type    Type    Clinicians Facility Department ID   

   

 

        2023         Outpatient         Carr[facility]  [id number]

 Common



        10:05:01                         Rosina                  [id number]   [facility]

 

        2022         Outpatient         [facility]  [id number]

 Common



        11:18:03                         Rosina                     [facility]

 

        2022         Outpatient         Carranza, ST[facility]ONLC  [facility]  [id number]

 Common



        08:07:02                         Rosina                     [facility]

 

        2022         Outpatient         [facility]  [id number]

 Common



        09:09:04                         Rosina                     [facility]

 

        2020         Inpatient RAIZA Molina, [facility]   PHYT    [id number] 

[facility]



        00:02:00                         Deb                  [facility]



                                                                        l of



                                                                        [place]

 

        2020         Inpatient RAIZA Molina, [facility]   PHYT    [id number] 

[facility]



        14:04:00                         Deb                  57      [facility]



                                                                        [facility]



                                                                        l of



                                                                        [place]

 

        2023 (TEL)                   [facility]  [id number] Co

mmon



        00:00:00 00:00:00                                                 [facility]

 

        2022 Outpatient NIDIA PORTILLO [facility]    [id number]

[id number] Univers



        09:15:00 09:56:33                 TRANSON                         ity [facility]

 

        2022 Travel                  1.2.840.1 [id number]

[id number] Univers



        00:00:00 00:00:00                         14945.1.1 350.1.13.10         

ity of



                                                3.104.2.7 4.2.7.3.698         Te

[place]



                                                .[id number] 084.8           Medica

l



                                                .8                      Branch

 

        2022 Outpatient R       BANDAR [facility]    [id number]

[id number] Univers



        10:45:00 11:04:21                 TRANSON                         ity [facility]

 

        2022 Travel                  1.2.840.1 1.2.910.914 [id number]

8096 Univers



        00:00:00 00:00:00                         18154.1.1 350.1.13.10         

ity of



                                                3.104.2.7 4.2.7.3.698         Te

[place]



                                                .[id number] 084.8           Medica

l



                                                .8                      [place]

 

        2022-10-06 2022-10-06 Outpatient R       BANDAR [facility]    [id number]

[id number] Univers



        10:15:00 11:18:44                 TRANSON                         ity [facility]

 

        2022-10-06 2022-10-06 Orders          Doctor  1.2.840.6 [id number]

[id number] Univers



        00:00:00 00:00:00 Only            Unassigned, 36340.1.1                 

ity of



                                        No Name 3.104.2.7                 [place]



                                                .[id number]                 Medica

l



                                                .8                      [place]

 

        2022-10-06 2022-10-06 Travel                  1.2.840.1 1.2.709.738 [id number]

[id number] Univers



        00:00:00 00:00:00                         19432.1.1 350.1.13.10         

ity of



                                                3.104.2.7 4.2.7.3.698         Te

[place]



                                                .3.469245 084.8           Medica

l



                                                .8                      [place]

 

        2022 Orders          Doctor  1.2.840.4 [id number]

359 Univers



        00:00:00 00:00:00 Only            Unassigned, 78752.1.1                 

ity of



                                        No Name 3.104.2.7                 [place]



                                                .[id number]                 Medica

l



                                                .8                      [place]

 

        2022 OFFICE                  [facility]  [id number] Co

mmon



        00:00:00 00:00:00 VISIT EST                                         Spir

it



                        PT LEVEL 3                                         - [facility]



                                                                        [facility]

 

        2022 OFFICE                  STLMLC  STLMLC  [id number] Co

mmon



        00:00:00 00:00:00 VISIT                                           Spirit



                        ESTAB PT                                         - [facility]



                        LEVEL 4                                         [facility]

 

        2022 (TEL)                   STLMLC  STLMLC  [id number] Co

mmon



        00:00:00 00:00:00                                                 [facility]

 

        2022 (TEL)                   STLMLC  STLMLC  [id number] Co

mmon



        00:00:00 00:00:00                                                 [facility]

 

        2022 OFFICE                  STLMLC  STLMLC  [id number] Co

mmon



        00:00:00 00:00:00 VISIT EST                                         Spir

it



                        PT LEVEL 3                                         [facility]

 

        2022 (TEL)                   STLMLC  STLMLC  [id number] Co

mmon



        00:00:00 00:00:00                                                 [facility]

 

        2022 OFFICE                  STLMLC  STLMLC  [id number] Co

mmon



        00:00:00 00:00:00 VISIT EST                                         Spir

it



                        PT LEVEL 3                                         [facility]

 

        2022 OFFICE                  STLMLC  STLMLC  [id number] Co

mmon



        00:00:00 00:00:00 VISIT NEW                                         Spir

it



                        PT LEVEL 3                                         [facility]

 

        2022 Outpatient R       PARVEZ STAPLES [facility]    [id number]

[id number] Univers



        14:00:00 14:00:00                                                 ity [facility]

 

        2021-10-13 2021-10-13 Nurse           Nurse, Lkj Tahir [facility] 1.2.840.

114 [id number] 

Univers



        10:00:39 10:24:33 Visit           Sheyla Paiz 350.1.13.10

         ity of



                                                Pediatric 4.2.7.2.686         

[place]



                                                Clinic  [phone]         [address]

 

        2021-10-13 2021-10-13 Outpatient R               [facility]    [id number]

[id number] Univers



        10:20:00 10:20:00                                                 ity [facility]

 

        2021-10-13 2021-10-13 Outpatient R               [facility]    [id number]

[id number] Univers



        10:00:00 10:00:00                                                 ity of



                                                                        [facility]

 

        2021 Telephone         [place] Brody    1.2.384.539 [id number]

[id number] Univers



        00:00:00 00:00:00                 Claudia NAVA   350.1.13.10         it

y of



                                                [facility] 4.2.7.2.686         Marco A

as



                                                        [phone]         [address]

 

        2021 Laboratory         Only, Ang Db Test [facility]    1.2.8

40.114 [id number] 

Univers



        09:03:10 09:18:10 Only            Song, [facility]  350.1.13.10      

   ity of



                                                [place] 4.2.7.2.686         [place] A

as



                                                [place]?Blea [phone]         Me

sasha boyd    [address]             [place]



                                                Medical                 



                                                Office                  



                                                Building                 

 

        2021 Outpatient R       MELA   [facility]    [id number]

[id number] Univers



        09:15:00 09:15:00                 JONNA                          ity [facility]

 

        2021-07-15 2021-07-15 Outpatient R               [facility]    [id number]

[id number] Univers



        10:00:00 10:00:00                                                 ity [facility]

 

        2021 Outpatient R       PARVEZ STAPLES [facility]    [id number]

[id number] Univers



        10:00:00 10:00:00                                                 ity [facility]

 

        2021 Laboratory         Lab, Adc Fam Pob I [facility]    1.2.

840.114 [id number] 

Univers



        14:55:01 15:00:42 Only            [facility]  350.1.13.10    

     ity of



                                                [place] 4.2.7.2.686         Marco A

as



                                                Professio [phone]         Me

sasha damon     [address]             [place]



                                                Office                  



                                                Building                 



                                                One                     

 

        2021 Outpatient R               [facility]    [id number]

[id number] Univers



        15:00:00 15:00:00                                                 ity [facility]

 

        2021 Outpatient R               [facility]    [id number]

558 Univers



        10:00:00 10:00:00                                                 ity of



                                                                        [facility]

 

        2021 Outpatient R               [facility]    [id number]

589 Univers



        11:15:00 11:15:00                                                 ity [facility]

 

        2021 Technician         2, [facility]    1.2.840.114 

[id number] Univers



        13:18:52 13:33:52 Visit           Parvez Staples [place] 350.1.13.10      

   ity of



                                                [place] 4.2.7.2.686         [place]

s



                                                Professio [phone]         Me

dical



                                                nal     353             [facility]                 

 

        2021 Outpatient R               [facility]    [id number]

[id number] Univers



        13:15:00 13:15:00                                                 ity [facility]

 

        2021 Outpatient R       PARVEZ STAPLES [facility]    103

[id number] Univers



        15:00:00 15:00:00                                                 ity [facility]

 

        2021 Orders          Doctor  [place]    1.2.840.114 [id number]

[id number] Univers



        00:00:00 00:00:00 Only            Unassigned, BRANDY   350.1.13.10       

  ity of



                                        [facility] 4.2.7.2.686         [place] A

as



                                                        [phone]         [address]

 

        2020 Outpatient RAIZA Molina [facility]    [id number]

[id number] [facility]



        14:37:00 14:37:00                 Deb                        [facility]

[facility]



                                                                        [facility]







Results







           Test Description Test Time  Test Comments Results    Result Comments 

Source









                    NIKO OR FELICITY ONLY - RPR 2021 07:59:00 









                      Test Item  Value      Reference Range Interpretation Comme

nts









             RPR (Qualitative) (test code = 88442-5) Nonreactive  Nonreactive   

            

 

             Lab Interpretation (test code = 89074-4) Normal                    

             



[facility]





             Test Item    Value        Reference Range Interpretation Comments

 

             PROLACTIN (test code = 2450698155) 8.8 ng/mL    3.3-26.7           

       

 

             Lab Interpretation (test code = Normal                             

    



             47535-8)                                            



[facility] WITH [id number] 22:10:00





             Test Item    Value        Reference Range Interpretation Comments

 

             HIV          Negative     Negative                  



             Semi-quantitative                                        



             (test code =                                        



             96590-1)                                            

 

             CAROLYN (test code = Non-reactive for HIV-1                           



             CAROLYN)         antigen and HIV-1/HIV-2                           



                          antibodies. ?No                           



                          laboratory evidence of                           



                          HIV infection. ?Repeat in                           



                          2-4 weeks if acute HIV                           



                          infection is suspected.                           



[facility] [id number] 22:01:00





             Test Item    Value        Reference Range Interpretation Comments

 

             TSH (test code =              See_Comment                [Automated

 message]



             1780365005)                                         The system "[employer] ([employer]



                                                                 generated this 

result



                                                                 transmitted ref

erence



                                                                 range: 0.45 - 4

.70



                                                                 mIU/L. The refe

rence



                                                                 range was not u

sed to



                                                                 interpret this 

result



                                                                 as normal/abnor

mal.

 

             Lab Interpretation (test Normal                                 



             code = 22239-9)                                        



[id number]TOTAL CHOLESTEROL, 
TRIGLYCERIDES, HDL)2021 21:32:00





             Test Item    Value        Reference Range Interpretation Comments

 

             CHOL (test code = 180 mg/dL    120-200                   



             [id number])                                         

 

             HDL (test code = 64 mg/dL     >50                       



             [id number])                                         

 

             HDLC RATIO (test code =              See_Comment                [Au

tomated message]



             [id number])                                         The system "[email or web address]



                                                                 generated this



                                                                 result transmit

ayleen



                                                                 reference range

:



                                                                 <=4.5. The refe

rence



                                                                 range was not u

sed



                                                                 to interpret th

is



                                                                 result as



                                                                 normal/abnormal

.

 

             TRIG (test code = 50 mg/dL                         



             [id number])                                         

 

             LDL CHOL (test code = 106 mg/dL    See_Comment                [Auto

mated message]



             93136-4)                                            The system "[email or web address]



                                                                 generated this



                                                                 result transmit

ayleen



                                                                 reference range

:



                                                                 <=160. The refe

rence



                                                                 range was not u

sed



                                                                 to interpret th

is



                                                                 result as



                                                                 normal/abnormal

.

 

             VLDL (test code = 10 mg/dL     5-60                      



             [id number])                                         

 

             Lab Interpretation (test Normal                                 



             code = 65759-6)                                        



[facility]NA, K, CL, CO2, 
GLUCOSE, BUN, CREATININE, CA)2021 21:31:00





             Test Item    Value        Reference Range Interpretation Comments

 

             NA (test code = 139 mmol/L   135-145                   



             [id number])                                         

 

             K (test code = 4.3 mmol/L   3.5-5                     



             9978774323)                                         

 

             CL (test code = 101 mmol/L                       



             7657579675)                                         

 

             CO2 TOTAL (test code = 29 mmol/L    23-31                     



             [id number])                                         

 

             AGAP (test code =              2-16                      



             [id number])                                         

 

             BUN (test code = 9 mg/dL      7-23                      



             3118396761)                                         

 

             GLUCOSE (test code = 90 mg/dL                         



             4720961403)                                         

 

             CREATININE (test code 0.68 mg/dL   0.5-1.04                  



             = [id number])                                        

 

             CALCIUM (test code = 9.7 mg/dL    8.6-10.6                  



             2442428854)                                         

 

             eGFR Calculation              mL/min/1.73m2              



             (Non-)                                        



             (test code =                                        



             0130860077)                                         

 

             eGFR Calculation              mL/min/1.73m2              



             (African American)                                        



             (test code =                                        



             5078960109)                                         

 

             CAROLYN (test code = CAROLYN) Association of                           



                          Glomerular Filtration                           



                          Rate (GFR) and Staging                           



                          of Kidney Disease*                           



                          +----------------------                           



                          -+---------------------                           



                          +----------------------                           



                          ---+| GFR (mL/min/1.73                           



                          m2) ?| With Kidney                           



                          Damage ?| ?Without                           



                          Kidney                                 



                          Damage+----------------                           



                          -------+---------------                           



                          ------+----------------                           



                          ---------+| ?>90 ? ? ?                           



                          ? ? ? ? ? ?| ?Stage one                           



                          ? ? ? ? ?| ? Normal ? ?                           



                          ? ? ? ? ?                              



                          ?+---------------------                           



                          --+--------------------                           



                          -+---------------------                           



                          ----+| ?60-89 ? ? ? ? ?                           



                          ? ? ?| ?Stage two ? ? ?                           



                          ? ?| ? Decreased GFR ?                           



                          ? ? ?                                  



                          +----------------------                           



                          -+---------------------                           



                          +----------------------                           



                          ---+| ?30-59 ? ? ? ? ?                           



                          ? ? ?| ?Stage three ? ?                           



                          ? ?| ? Stage three ? ?                           



                          ? ? ?                                  



                          +----------------------                           



                          -+---------------------                           



                          +----------------------                           



                          ---+| ?15-29 ? ? ? ? ?                           



                          ? ? ?| ?Stage four ? ?                           



                          ? ? | ? Stage four ? ?                           



                          ? ? ?                                  



                          ?+---------------------                           



                          --+--------------------                           



                          -+---------------------                           



                          ----+| ?<15 (or                           



                          dialysis) ? ?| ?Stage                           



                          five ? ? ? ? | ? Stage                           



                          five ? ? ? ? ?                           



                          ?+---------------------                           



                          --+--------------------                           



                          -+---------------------                           



                          ----+ *Each stage                           



                          assumes the associated                           



                          GFR level has been in                           



                          effect for at least                           



                          three months. ?Stages 1                           



                          to 5, with or without                           



                          kidney disease,                           



                          indicate chronic kidney                           



                          disease. Notes:                           



                          Determination of stages                           



                          one and two (with eGFR                           



                          >59mL/min/1.73 m2)                           



                          requires estimation of                           



                          kidney damage for at                           



                          least three months as                           



                          defined by structural                           



                          or functional                           



                          abnormalities of the                           



                          kidney, manifested by                           



                          either:Pathological                           



                          abnormalities or                           



                          Markers of kidney                           



                          damage (including                           



                          abnormalities in the                           



                          composition of the                           



                          blood or urine or                           



                          abnormalities in                           



                          imaging tests).                           



[facility]

## 2023-01-25 NOTE — EDPHYS
Physician Documentation                                                                           

 [facility]                                                                 

Name: Clarissa Anand                                                                            

Age: 22 yrs                                                                                       

Sex: Female                                                                                       

: 2000                                                                                   

MRN: [id number]                                                                                   

Arrival Date: 2023                                                                          

Time: 07:07                                                                                       

Account#: [id number]                                                                            

Bed 3                                                                                             

Private MD:                                                                                       

ED Physician Afshin Mcdaniel                                                                     

HPI:                                                                                              

                                                                                             

07:40 This 22 yrs old Female presents to ER via EMS with complaints of Seizure.               rt  

07:40 Unable to obtain HPI due to obtunded state. History obtained by EMS due to altered      rt  

      mental status. Patient was reportedly taken off of her Keppra about 3 days ago. Patient     

      found to be in a seizure. Reportedly had convulsive activity for about 40 minutes,          

      received a total of 4 mg of Ativan that did stop the seizure like activity. Reportedly      

      had a blood glucose of 300 with ketones. No further history could be obtained. Symptoms     

      are severe in severity, no other reported aggravating or alleviating factors.               

                                                                                                  

Historical:                                                                                       

- Allergies:                                                                                      

07:47 PENICILLINS;                                                                            ph  

- PMHx:                                                                                           

07:47 Seizure;                                                                                ph  

                                                                                                  

- Immunization history:: Adult Immunizations unknown.                                             

- Social history:: Smoking status: unknown.                                                       

- Unable to obtain history due to: obtunded state.                                                

                                                                                                  

                                                                                                  

ROS:                                                                                              

07:40 Unable to obtain ROS due to altered mental status.                                      rt  

                                                                                                  

Exam:                                                                                             

08:02 Head/Face:  Normocephalic, atraumatic. Chest/axilla:  Normal chest wall appearance and  rt  

      motion.  Nontender with no deformity.  No lesions are appreciated. Cardiovascular:          

      Regular rate and rhythm with a normal S1 and S2.  No gallops, murmurs, or rubs.  Normal     

      PMI, no JVD.  No pulse deficits. Abdomen/GI:  Soft, non-tender, with normal bowel           

      sounds.  No distension or tympany.  No guarding or rebound.  No evidence of tenderness      

      throughout. MS/ Extremity:  Pulses equal, no cyanosis.  Neurovascular intact.  Full,        

      normal range of motion.                                                                     

08:02 Constitutional: The patient appears Appears to be currently seizing with rightward gaze     

      deviation, obtunded, in acute distress                                                      

08:02 Eyes: Pupils dilated, right gaze deviation.                                                 

08:02 ECG was reviewed by the Attending Physician.                                                

08:02 Respiratory: Coarse respirations heard.                                                     

08:02 Neuro: Obtunded, no response to noxious stimuli.                                            

08:02 Psych: Not assessable.                                                                      

                                                                                                  

Vital Signs:                                                                                      

07:23 Pulse Ox 100% on ETT vent; Weight 49.9 kg; Height 5 ft. 4 in. (162.56 cm);              iw  

07:45  / 99; Pulse 92; Resp 23; Pulse Ox 100% on ETT vent;                              ph  

07:57  / 63; Pulse 82; Resp 16 A; Pulse Ox 100% on ETT vent;                            iw  

08:28  / 60; Pulse 82; Resp 20; Pulse Ox 100% on ETT vent;                              iw  

10:38  / 64; Pulse 85; Resp 18; Temp 97.7; Pulse Ox 93% on ETT vent;                    ph  

11:41  / 83; Pulse 84; Resp 26; Pulse Ox 97% on ETT vent;                               mb9 

12:01  / 96; Pulse 101; Resp 20; Pulse Ox 96% on ETT vent;                              mb9 

07:23 Body Mass Index 18.88 (49.90 kg, 162.56 cm)                                             iw  

                                                                                                  

Eileen Coma Score:                                                                               

07:07 Eye Response: none(1). Verbal Response: none(1). Motor Response: withdraws from         jl7 

      pain(4). Total: 6.                                                                          

                                                                                                  

Procedures:                                                                                       

11:18 Intubation: Ventilated with 100% NRB prior to procedure. O2 saturation prior to         rt  

      procedure was 98 %. Intubated orally using glidescope with 7.5 mm ETT. was successful       

      on first attempt. Ventilated with ventilator. Tube secured with ETT em Placement        

      verified by CXR, CO2 detector with (+) color change, auscultating bilateral breath          

      sounds, O2 saturation after procedure was 100 %. Patient tolerated well.                    

                                                                                                  

MDM:                                                                                              

07:19 Patient medically screened.                                                             rt  

11:18 Differential diagnosis: seizure, Status epilepticus, DKA, electrolyte disturbance. Data rt  

      reviewed: vital signs, nurses notes, lab test result(s), EKG, radiologic studies.           

      Consideration of Admission/Observation Escalation of care including                         

      admission/observation considered. Management of patient was discussed with the              

      following: Consultant: Neurologist, recommends transfer for continuous EEG monitoring.      

      I considered the following discharge prescriptions or medication management in the          

      emergency department Medications were administered in the Emergency Department. See         

      MAR. Independent interpretation of the following test(s) in the Emergency Department        

      X-Ray: My interpretation is Chest x-ray reviewed, ET tube in appropriate position.          

      Historians other than the Patient: EMS: . Parent: . Response to treatment: the              

      patient's symptoms have markedly improved after treatment. ED course: Consideration was     

      given to diabetic ketoacidosis, patient has no prior history of diabetes. High anion        

      gap metabolic acidosis likely due to prolonged seizure. Repeat BMP shows closure of the     

      gap prior to any insulin, was canceled. Ketones are negative. Patient was significant       

      improving blood gas on ventilator..                                                         

                                                                                                  

                                                                                             

07:22 Order name: CBC with Diff                                                               rt  

                                                                                             

07:22 Order name: CMP; Complete Time: 08:27                                                   rt  

                                                                                             

07:22 Order name: Magnesium; Complete Time: 08:27                                             rt  

                                                                                             

07:22 Order name: Ketone, Serum; Complete Time: 08:27                                         rt  

                                                                                             

07:22 Order name: ABG; Complete Time: 10:31                                                   rt  

                                                                                             

07:22 Order name: ETOH Level; Complete Time: 08:12                                            rt  

                                                                                             

07:22 Order name: Acetaminophen; Complete Time: 08:27                                         rt  

                                                                                             

07:22 Order name: Salicylate; Complete Time: 08:12                                            rt  

                                                                                             

07:22 Order name: UDS; Complete Time: 09:59                                                   rt  

                                                                                             

07:22 Order name: Pregnancy Test, Serum; Complete Time: 08:18                                 rt  

                                                                                             

07:22 Order name: UA; Complete Time: 08:27                                                    rt  

                                                                                             

07:22 Order name: Lactate w/ 2H reflex if indic.; Complete Time: 08:12                        rt  

                                                                                             

07:22 Order name: SARS RAPID; Complete Time: 08:18                                            rt  

                                                                                             

08:00 Order name: Glucose, Ancillary Testing; Complete Time: 08:12                            EDMS

                                                                                             

07:22 Order name: Chest Single View XRAY; Complete Time: 08:27                                rt  

                                                                                             

07:22 Order name: CT Head Brain wo Cont; Complete Time: 08:41                                 rt  

                                                                                             

09:04 Order name: Manual Differential                                                         EDMS

                                                                                             

09:20 Order name: BMP; Complete Time: 11:59                                                   rt  

                                                                                             

09:39 Order name: Hemoglobin A1c                                                              rt  

                                                                                             

09:44 Order name: Glucose, Ancillary Testing; Complete Time: 09:47                            EDMS

                                                                                             

10:26 Order name: ABG Arterial Blood Gas; Complete Time: 10:31                                EDMS

                                                                                             

07:22 Order name: Urine Dipstick-Ancillary (obtain specimen); Complete Time: 07:50            rt  

                                                                                             

07:22 Order name: Jareth; Complete Time: 07:40                                                 rt  

                                                                                             

07:27 Order name: EKG; Complete Time: 07:28                                                   rt  

                                                                                             

07:27 Order name: EKG - Nurse/Tech; Complete Time: 07:57                                      rt  

                                                                                                  

EC:02 Rate is 87 beats/min. Rhythm is regular, Normal Sinus Rhythm with No ectopy. QRS Axis   rt  

      is Normal. UT interval is normal. QRS interval is normal. QT interval is normal. No Q       

      waves. T waves are Normal. No ST changes noted. Clinical impression: Normal ECG.            

      Interpreted by me.                                                                          

                                                                                                  

Administered Medications:                                                                         

07:11 Drug: Propofol 75 mg Route: IVP; Site: left antecubital;                                iw  

07:20 Follow up: Response: No adverse reaction                                                jl7 

07:11 Drug: Rocuronium 75 mg Route: IVP; Site: left antecubital;                              iw  

07:45 Follow up: Response: No adverse reaction                                                jl7 

07:24 Drug: Keppra (levETIRAcetam) 2000 mg Route: IV; Rate: bolus; Site: left antecubital;    iw  

08:00 Follow up: Response: No adverse reaction; IV Status: Completed infusion                 jl7 

07:39 Drug: Propofol 5 mcg/kg/min Route: IV; Rate: calculated rate; Site: left antecubital;   iw  

07:50 Follow up: Rate change 20 mcg/kg/min                                                    iw  

07:55 Follow up: Response: RASS: Restless (+1); Rate change 25 mcg/kg/min                     jl7 

08:00 Follow up: Response: RASS: Light sedation (-2)                                          jl7 

08:30 Follow up: Response: RASS: Restless (+1); Rate change 30 mcg/kg/min                     jl7 

08:40 Follow up: Response: RASS: Light sedation (-2)                                          jl7 

09:30 Follow up: Response: RASS: Restless (+1); Rate change 35 mcg/kg/min                     jl7 

09:30 Follow up: Response: RASS: Restless (+1)                                                jl7 

10:30 Follow up: Response: RASS: Restless (+1); Rate change 40 mcg/kg/min                     jl7 

11:05 Follow up: Response: RASS: Light sedation (-2)                                          jl7 

07:50 Drug: Lactated Ringers Solution 1000 ml Route: IV; Rate: bolus; Site: left hand;        iw  

09:00 Follow up: Response: No adverse reaction; IV Status: Completed infusion; IV Intake:     jl7 

      1000ml                                                                                      

09:38 Drug: fentaNYL (PF) 50 mcg Route: IVP; Site: left hand;                                 5 

10:00 Follow up: Response: No adverse reaction                                                jl7 

10:28 Drug: Potassium Chloride 40 mEq Route: IV; Rate: calculated rate; Site: left              

      antecubital;                                                                                

12:30 Follow up: IV Status: Infusion continued upon transfer                                  iw  

10:50 Not Given (Physician Discretion): Potassium Chloride Liquid 40 mEq PO once              jl7 

10:50 Drug: Calcium Gluconate 1 grams Route: IVPB; Infused Over: 60 mins; Site: left hand;    jl7 

12:01 Follow up: Response: No adverse reaction; IV Status: Completed infusion                 mb9 

10:55 Not Given (Physician Discretion): Insulin Drip - (Insulin Regular Human 100 units, NS   ph  

      0.9% 100 ml) IV at calculated rate continuous; Standard concentration 1unit/ml; Dose        

      for DKA is 0.1 units/kg/hr                                                                  

11:55 Drug: fentaNYL (PF) 50 mcg Route: IVP; Site: left hand;                                 mb9 

12:00 Follow up: Response: No adverse reaction                                                mb9 

11:58 Drug: Zofran (Ondansetron) 4 mg Route: IVP; Site: left hand;                            mb9 

12:01 Follow up: Response: No adverse reaction                                                mb9 

                                                                                                  

                                                                                                  

Disposition Summary:                                                                              

23 09:50                                                                                    

Transfer Ordered                                                                                  

      Transfer Location: [facility]                                rt  

      Reason: Higher level of care                                                            rt  

      Condition: Critical                                                                     rt  

      Problem: an acute exacerbation                                                          rt  

      Symptoms: have improved                                                                 rt  

      Accepting Physician: Dr. Fox(23 12:35)                                      mb9 

      Diagnosis                                                                                   

        - Epilepsy, unspecified, not intractable, with status epilepticus                     rt  

        - Hypokalemia                                                                         rt  

        - Acute respiratory failure                                                           rt  

      Forms:                                                                                      

        - Medication Reconciliation Form                                                      rt  

        - SBAR form                                                                           rt  

Signatures:                                                                                       

Dispatcher [employer]                           Lucy Mares RN RN iw Hall, Patricia RN                      RN                                                      

Thomas Tomlinson RN                        RN   jl7                                                  

Mary Guzman RN                       RN   jh5                                                  

Libby Bob RN                 RN   mb9                                                  

Afshin Mcdaniel MD MD   rt                                                   

                                                                                                  

Corrections: (The following items were deleted from the chart)                                    

11:42 09:50 Dr. Fox rt                                                                  mb9 

12:35 11:42 Dr. Fox mb9                                                                 mb9 

                                                                                                  

**************************************************************************************************

## 2023-08-12 NOTE — RAD REPORT
EXAM DESCRIPTION:  RAD - Chest Single View - 8/4/2022 8:43 am

 

CLINICAL HISTORY:  r/o aspiration

Chest pain.

 

COMPARISON:  Chest Pa And Lat (2 Views) dated 9/6/2017; Abdomen   Pelvis W Contrast dated 8/3/2022

 

FINDINGS:  Portable technique limits examination quality.

 

The lungs are grossly clear. The heart is normal in size. No displaced fractures.

 

IMPRESSION:  No acute intrathoracic process suspected. The contact number we have on record for her does not seem to be working. I tried it several times but will not go through.   Letter has been mailed to pt asking that she call the office.   Evaluate Carotid Stenosis 89 y/o M w/ syncope